# Patient Record
Sex: FEMALE | Race: WHITE
[De-identification: names, ages, dates, MRNs, and addresses within clinical notes are randomized per-mention and may not be internally consistent; named-entity substitution may affect disease eponyms.]

---

## 2018-11-26 ENCOUNTER — HOSPITAL ENCOUNTER (INPATIENT)
Dept: HOSPITAL 88 - ER | Age: 58
LOS: 4 days | Discharge: HOME | DRG: 371 | End: 2018-11-30
Attending: INTERNAL MEDICINE | Admitting: INTERNAL MEDICINE
Payer: COMMERCIAL

## 2018-11-26 VITALS — WEIGHT: 174 LBS | BODY MASS INDEX: 30.83 KG/M2 | HEIGHT: 63 IN

## 2018-11-26 DIAGNOSIS — Z79.84: ICD-10-CM

## 2018-11-26 DIAGNOSIS — Z88.2: ICD-10-CM

## 2018-11-26 DIAGNOSIS — Z88.8: ICD-10-CM

## 2018-11-26 DIAGNOSIS — R91.8: ICD-10-CM

## 2018-11-26 DIAGNOSIS — Z91.012: ICD-10-CM

## 2018-11-26 DIAGNOSIS — K58.9: ICD-10-CM

## 2018-11-26 DIAGNOSIS — I10: ICD-10-CM

## 2018-11-26 DIAGNOSIS — A04.72: Primary | ICD-10-CM

## 2018-11-26 DIAGNOSIS — R11.2: ICD-10-CM

## 2018-11-26 DIAGNOSIS — E11.9: ICD-10-CM

## 2018-11-26 DIAGNOSIS — J18.9: ICD-10-CM

## 2018-11-26 DIAGNOSIS — R53.81: ICD-10-CM

## 2018-11-26 LAB
ALBUMIN SERPL-MCNC: 4.7 G/DL (ref 3.5–5)
ALBUMIN/GLOB SERPL: 1.3 {RATIO} (ref 0.8–2)
ALP SERPL-CCNC: 98 IU/L (ref 40–150)
ALT SERPL-CCNC: 76 IU/L (ref 0–55)
AMYLASE SERPL-CCNC: 55 U/L (ref 25–125)
ANION GAP SERPL CALC-SCNC: 17.6 MMOL/L (ref 8–16)
BACTERIA URNS QL MICRO: (no result) /HPF
BASOPHILS # BLD AUTO: 0 10*3/UL (ref 0–0.1)
BASOPHILS NFR BLD AUTO: 0.4 % (ref 0–1)
BILIRUB UR QL: (no result)
BNP BLD-MCNC: < 10 PG/ML (ref 0–100)
BUN SERPL-MCNC: 22 MG/DL (ref 7–26)
BUN/CREAT SERPL: 24 (ref 6–25)
CALCIUM SERPL-MCNC: 10.9 MG/DL (ref 8.4–10.2)
CHLORIDE SERPL-SCNC: 91 MMOL/L (ref 98–107)
CLARITY UR: (no result)
CO2 SERPL-SCNC: 28 MMOL/L (ref 22–29)
COARSE GRAN CASTS URNS QL MICRO: (no result)
COLOR UR: YELLOW
DEPRECATED NEUTROPHILS # BLD AUTO: 7.3 10*3/UL (ref 2.1–6.9)
DEPRECATED RBC URNS MANUAL-ACNC: (no result) /HPF (ref 0–5)
EGFRCR SERPLBLD CKD-EPI 2021: > 60 ML/MIN (ref 60–?)
EOSINOPHIL # BLD AUTO: 0.2 10*3/UL (ref 0–0.4)
EOSINOPHIL NFR BLD AUTO: 1.4 % (ref 0–6)
EPI CELLS URNS QL MICRO: (no result) /LPF
ERYTHROCYTE [DISTWIDTH] IN CORD BLOOD: 12 % (ref 11.7–14.4)
GLOBULIN PLAS-MCNC: 3.7 G/DL (ref 2.3–3.5)
GLUCOSE SERPLBLD-MCNC: 247 MG/DL (ref 74–118)
HCT VFR BLD AUTO: 40 % (ref 34.2–44.1)
HGB BLD-MCNC: 14.3 G/DL (ref 12–16)
KETONES UR QL STRIP.AUTO: NEGATIVE
LEUKOCYTE ESTERASE UR QL STRIP.AUTO: NEGATIVE
LIPASE SERPL-CCNC: 61 U/L (ref 8–78)
LYMPHOCYTES # BLD: 2.7 10*3/UL (ref 1–3.2)
LYMPHOCYTES NFR BLD AUTO: 23.7 % (ref 18–39.1)
MAGNESIUM SERPL-MCNC: 1.9 MG/DL (ref 1.3–2.1)
MCH RBC QN AUTO: 29.5 PG (ref 28–32)
MCHC RBC AUTO-ENTMCNC: 35.8 G/DL (ref 31–35)
MCV RBC AUTO: 82.6 FL (ref 81–99)
MONOCYTES # BLD AUTO: 1 10*3/UL (ref 0.2–0.8)
MONOCYTES NFR BLD AUTO: 9.1 % (ref 4.4–11.3)
MUCOUS THREADS URNS QL MICRO: (no result)
NEUTS SEG NFR BLD AUTO: 64.7 % (ref 38.7–80)
NITRITE UR QL STRIP.AUTO: NEGATIVE
PLATELET # BLD AUTO: 512 X10E3/UL (ref 140–360)
POTASSIUM SERPL-SCNC: 2.6 MMOL/L (ref 3.5–5.1)
PROT UR QL STRIP.AUTO: (no result)
RBC # BLD AUTO: 4.84 X10E6/UL (ref 3.6–5.1)
SODIUM SERPL-SCNC: 134 MMOL/L (ref 136–145)
SP GR UR STRIP: 1.02 (ref 1.01–1.02)
UROBILINOGEN UR STRIP-MCNC: 0.2 MG/DL (ref 0.2–1)
WBC #/AREA URNS HPF: (no result) /HPF (ref 0–5)

## 2018-11-26 PROCEDURE — 83880 ASSAY OF NATRIURETIC PEPTIDE: CPT

## 2018-11-26 PROCEDURE — 71250 CT THORAX DX C-: CPT

## 2018-11-26 PROCEDURE — 83735 ASSAY OF MAGNESIUM: CPT

## 2018-11-26 PROCEDURE — 80053 COMPREHEN METABOLIC PANEL: CPT

## 2018-11-26 PROCEDURE — 82150 ASSAY OF AMYLASE: CPT

## 2018-11-26 PROCEDURE — 99285 EMERGENCY DEPT VISIT HI MDM: CPT

## 2018-11-26 PROCEDURE — 86738 MYCOPLASMA ANTIBODY: CPT

## 2018-11-26 PROCEDURE — 87040 BLOOD CULTURE FOR BACTERIA: CPT

## 2018-11-26 PROCEDURE — 82553 CREATINE MB FRACTION: CPT

## 2018-11-26 PROCEDURE — 81001 URINALYSIS AUTO W/SCOPE: CPT

## 2018-11-26 PROCEDURE — 71045 X-RAY EXAM CHEST 1 VIEW: CPT

## 2018-11-26 PROCEDURE — 83690 ASSAY OF LIPASE: CPT

## 2018-11-26 PROCEDURE — 84484 ASSAY OF TROPONIN QUANT: CPT

## 2018-11-26 PROCEDURE — 82550 ASSAY OF CK (CPK): CPT

## 2018-11-26 PROCEDURE — 87086 URINE CULTURE/COLONY COUNT: CPT

## 2018-11-26 PROCEDURE — 36415 COLL VENOUS BLD VENIPUNCTURE: CPT

## 2018-11-26 PROCEDURE — 87390 HIV-1 AG IA: CPT

## 2018-11-26 PROCEDURE — 80048 BASIC METABOLIC PNL TOTAL CA: CPT

## 2018-11-26 PROCEDURE — 93005 ELECTROCARDIOGRAM TRACING: CPT

## 2018-11-26 PROCEDURE — 83605 ASSAY OF LACTIC ACID: CPT

## 2018-11-26 PROCEDURE — 87493 C DIFF AMPLIFIED PROBE: CPT

## 2018-11-26 PROCEDURE — 86631 CHLAMYDIA ANTIBODY: CPT

## 2018-11-26 PROCEDURE — 74177 CT ABD & PELVIS W/CONTRAST: CPT

## 2018-11-26 PROCEDURE — 94640 AIRWAY INHALATION TREATMENT: CPT

## 2018-11-26 PROCEDURE — 87449 NOS EACH ORGANISM AG IA: CPT

## 2018-11-26 PROCEDURE — 82270 OCCULT BLOOD FECES: CPT

## 2018-11-26 PROCEDURE — 85025 COMPLETE CBC W/AUTO DIFF WBC: CPT

## 2018-11-26 PROCEDURE — 86332 IMMUNE COMPLEX ASSAY: CPT

## 2018-11-26 RX ADMIN — IPRATROPIUM BROMIDE AND ALBUTEROL SULFATE SCH ML: .5; 2.5 SOLUTION RESPIRATORY (INHALATION) at 21:45

## 2018-11-26 RX ADMIN — SODIUM CHLORIDE SCH MLS/HR: 9 INJECTION, SOLUTION INTRAVENOUS at 21:46

## 2018-11-26 RX ADMIN — METRONIDAZOLE SCH MLS/HR: 500 INJECTION, SOLUTION INTRAVENOUS at 22:23

## 2018-11-26 RX ADMIN — IPRATROPIUM BROMIDE AND ALBUTEROL SULFATE SCH ML: .5; 2.5 SOLUTION RESPIRATORY (INHALATION) at 19:00

## 2018-11-26 NOTE — XMS REPORT
Patient Summary Document

                             Created on: 2018



MIRIAM LIMON

External Reference #: [id number]

: 1960

Sex: Female



Demographics







                          Address                   [address] [place] [address]

 

                          Home Phone                [phone]

 

                          Preferred Language        Unknown

 

                          Marital Status            Unknown

 

                          Shinto Affiliation     Unknown

 

                          Race                      Unknown

 

                                        Additional Race(s)  

 

                          Ethnic Group              Unknown





Author







                          [facility]                    [facility]

 

                          [facility]

 

                          Address                   Unknown

 

                          Phone                     Unavailable







Care Team Providers







                    Care Team Member Name    Role                Phone

 

                    BABATUNDE VEGA    Unavailable         Unavailable







Problems

This patient has no known problems.



Allergies, Adverse Reactions, Alerts

This patient has no known allergies or adverse reactions.



Medications

This patient has no known medications.



Results







           Test Description    Test Time    Test Comments    Text Results    Atomic Results    Result

 Comments

 

                CHEST SINGLE (NOT PORTABLE)    2018 14:58:00                                               

                                                           [place] [address]      Patient Name: MIRIAM LIMON                        
          MR #: [id number]                     : 1960                  
                Age/Sex: 57/F  Acct #: [id number]                             
Req #: [id number]  Adm Physician:                                               
      Ordered by: ZEINA GARAY NP                            Report #: 
[id number]        Location: ER                                      Room/Bed:    
                
___________________________________________________________________________________________________
   Procedure: [id number] DX/CHEST SINGLE (NOT PORTABLE)  Exam Date:              
              Exam Time:                                               REPORT 
STATUS: Signed    EXAM: XR CHEST 1 VIEW      DATE: 2018 1:18 PM      
INDICATION: Fever       COMPARISON: 2015, no report available      
FINDINGS:      Lines and Tubes: None      Heart and Mediastinum: No acute 
cardiomediastinal findings.      Lungs and Pleura: No significant pleural 
effusion, pneumothorax, or focal   consolidation.      Bones and Soft Tissues: 
No acute findings.      IMPRESSION:    1.  No acute cardiopulmonary findings.   
  Signed by: Dr. Justo Pickett MD on 2018 2:58 PM        Dictated By: JUSTO PICKETT MD  Electronically Signed By: JUSTO PICKETT MD on 18 [id number]  
Transcribed By: GET on 18 145       COPY TO:   ZEINA GARAY NP

## 2018-11-26 NOTE — DIAGNOSTIC IMAGING REPORT
EXAM: CT Abdomen and Pelvis WITH contrast  

INDICATION:      

^abdominal pain

^[id number]

^1914

^Y 

COMPARISON: Chest x-ray dated 11/26/2018

TECHNIQUE: Abdomen and pelvis were scanned utilizing a multidetector helical

scanner from the lung base to the pubic symphysis after administration of IV

contrast. Coronal and sagittal reformations were obtained. Dose modulation,

iterative reconstruction, and/or weight based adjustment of the mA/kV was

utilized to reduce the radiation dose to as low as reasonably achievable.

Routine protocol was performed. Scan was performed when during portal venous

phase.    

     IV CONTRAST: 100 mL of Isovue-370

     ORAL CONTRAST: Water

            

COMPLICATIONS: None



RADIATION DOSE:

     Total DLP: 485.78 mGy*cm

     Estimated effective dose: (DLP x 0.015 x size factor) mSv

     CTDIvol has been reviewed. It is below the limits set by the Radiation

Protocol Committee (RPC).



FINDINGS:



LINES and TUBES: None.



LOWER THORAX:  Multiple bilateral nodules versus patchy opacities, measuring up

to 1.8 cm.



HEPATOBILIARY: Hepatomegaly. Diffuse hepatic steatosis.  No focal hepatic

lesions. No biliary ductal dilation. 



GALLBLADDER: Surgically absent.



SPLEEN: No splenomegaly. 



PANCREAS: No focal masses or ductal dilatation.  



ADRENALS: No left adrenal nodule. Focal thickening of the right adrenal base

versus nodules, measuring 1.3 cm and 0.9 cm.    



KIDNEYS/URETERS: Kidneys enhance symmetrically.  No hydronephrosis. No cystic

or solid mass lesions.  No stones.



GI TRACT: No abnormal distention or evidence of bowel obstruction. 

Questionable distal rectal wall thickening versus underdistention (series 2,

image 79). Few scattered colonic diverticula without evidence of

diverticulitis. Appendix is not visualized.



PELVIC ORGANS/BLADDER: Hysterectomy. Bladder is unremarkable.



LYMPH NODES: No lymphadenopathy.



VESSELS: Unremarkable.



PERITONEUM / RETROPERITONEUM: No free air or fluid.



BONES: Mild scoliosis and degenerative changes of lumbar spine, most notable at

L4-L5.



SOFT TISSUES: Unremarkable.            



IMPRESSION: 

1.  No acute inflammatory process in the abdomen/pelvis.

2.  Multiple bilateral lung base nodular densities versus patchy opacities.

Differentials include multifocal pneumonia, atypical infectious process, or

metastatic disease. Recommend correlation with dedicated chest CT.

3.  Questionable distal rectal wall thickening versus underdistention. If

clinically indicated, this can be further evaluated with endoscopic

examination.

4.  Small indeterminate right adrenal nodules.

5.  Enlarged steatotic liver.



Signed by: Dr. Miguelito Ramon MD on 11/26/2018 8:59 PM

## 2018-11-26 NOTE — XMS REPORT
Continuity of Care Document

                             Created on: 10/27/2016



MIRIAM LIMON

External Reference #: [id number]

: 1960

Sex: Female



Demographics







                          Address                   [address] [place] [address]  [id number]

 

                          Home Phone                [phone]

 

                          Preferred Language        Unknown

 

                          Marital Status            Unknown

 

                          Protestant Affiliation     Unknown

 

                          Race                      Unknown

 

                          Ethnic Group              Unknown





Author







                          Author                    [facility]              Interface

 

                          Address                   Unknown

 

                          Phone                     Unavailable



                                                    



Problems

                    





                    Problem                            Status                            Onset Date     

                          Classification                            Date Reported       

                          Comments                            Source                    

 

                    Acute sinusitis                                                        10/28/2016   

                          Diagnosis                            10/28/2016             

                                                      [facility]                    

 

                    Viral disease                                                        10/28/2016     

                          Diagnosis                            10/28/2016               

                                                      [facility]                    

 

                    Coxsackie virus disease                                                        10/09/2016

                            Diagnosis                            10/28/2016          

                                                      [facility]                    

 

                    Coxsackie Virus Disease                                                             

                          Problem                            10/28/2016                 

                                                      [facility]                    

 

                    Viral Disease                                                                       

                    Problem                            10/28/2016                             

                                        [facility]                    

 

                    Acute Sinusitis                                                                     

                          Problem                            10/28/2016                         

                                                      [facility]                    



                                                                                
                                                                                
      



Medications

                    





                    Medication                            Details                            Route      

                          Status                            Patient Instructions         

                          Ordering Provider                            Order Date           

                                        Source                    

 

                          Fenofibrate 48 MG Oral Tablet                            fenofibrate nanocrystallized

 48 mg tablet                                                        Active          

                                                                                         

                                        [facility]                    

 

                          gabapentin 300 MG Oral Capsule                            gabapentin 300 mg capsule

                                                        Active                       

                                                                                                    

[facility]                    

 

                          Hydrochlorothiazide 25 MG Oral Tablet                            hydrochlorothiazide

 25 mg tablet                                                        Active          

                                                                                         

                                        [facility]                    

 

                          Hydroxyzine Hydrochloride 25 MG Oral Tablet                            hydroxyzine

 HCl 25 mg tablet                                                        Active      

                                                                                       

                                        [facility]                    

 

                          Losartan Potassium 50 MG Oral Tablet                            losartan 50 mg tablet

                                                        Active                       

                                                                                                    

[facility]                    

 

                          Metformin hydrochloride 1000 MG Oral Tablet                            metformin 1,000

 mg tablet                                                        Active             

                                                                                            

                                        [facility]                    

 

                          Metoprolol Tartrate 50 MG Oral Tablet                            metoprolol tartrate

 50 mg tablet                                                        Active          

                                                                                         

                                        [facility]                    

 

                          Ondansetron 8 MG Disintegrating Oral Tablet                            ondansetron

 8 mg disintegrating tablet                                                        Active

                                                                                       

                                        [facility]                    

 

                          Sertraline 50 MG Oral Tablet                            sertraline 50 mg tablet   

                                                     Active                            

                                                                                    [facility]

                    

 

                          tizanidine 4 MG Oral Tablet                            tizanidine 4 mg tablet     

                                                   Active                              

                                                                                  [facility]

                    

 

                          Triamcinolone Acetonide 0.001 MG/MG Topical Ointment                            triamcinolone

 acetonide 0.1 % topical ointment                                                  

                    Active                                                                           

                                                      [facility]                    

 

                                        Amoxicillin 875 MG / Clavulanate 125 MG Oral Tablet [Augmentin]                 

                                        Augmentin 875 mg-125 mg tablet Take 1 tablet every 12 hours by oral route

 with meals for 10 days.                                                        Active

                                                                                       

                                        [facility]                    

 

                          benzonatate 200 MG Oral Capsule                            benzonatate 200 mg capsule

                                                        Active                       

                                                                                                    

[facility]                    

 

                                        Ciprofloxacin 3 MG/ML / Dexamethasone 1 MG/ML Otic Suspension [Ciprodex]        

                                        Ciprodex 0.3 %-0.1 % ear drops,suspension                      

                                                Active                                               

                                                                            [facility]            

        

 

                                        Fluticasone propionate 0.05 MG/ACTUAT Metered Dose Nasal Spray                  

                                        fluticasone 50 mcg/actuation nasal spray,suspension                      

                                                Active                                               

                                                                            [facility]            

        

 

                          Ondansetron 4 MG Oral Tablet                            ondansetron HCl 4 mg tablet

                                                        Active                       

                                                                                                    

[facility]                    

 

                          benzonatate 100 MG Oral Capsule [Tessalon Perles]                            Tessalon

 Perles 100 mg capsule Take 1 capsule 3 times a day by oral route as needed for 
cough.                                                        Active                 

                                                                                                

                                        [facility]                    



                                                                                
                                                                                
                                                                                
                                                                                
                    



Allergies, Adverse Reactions, Alerts

                    





                    Substance                            Category                            Reaction   

                          Severity                            Reaction type           

                          Status                            Date Reported                     

                          Comments                            Source                    

 

                    Egg Derived                                                                         

                                                Allergy to substance                            

                            10/09/2016                                               

                                        [facility]                    

 

                    Fish Derived                                                                        

                                                      Allergy to substance                         

                                                10/09/2016                                              

                                        [facility]                    

 

                    Soma                                                                                

                                                Allergy to substance                                   

                     10/09/2016                                                        

[facility]                    

 

                          Sulfa (Sulfonamide Antibiotics)                                                   

                                                                            Allergy to substance      

                                                      10/09/2016                         

                                                      [facility]                    



                                                                                
               



Immunizations

                    





                    Immunization                            Date Given                            Site  

                          Status                            Last Updated             

                          Comments                            Source                    



                                                                        



Results

                    





                    Order Name                            Results                            Value      

                          Reference Range                            Date                

                          Interpretation                            Comments                       

                                        Source                    

 

                                                Influenza A                            negative         

                                                 10/28/2016                            

                                                        [facility]                   

 

 

                                                Influenza B                            negative         

                                                 10/28/2016                            

                                                        [facility]                   

 

 

                                                RESULT                            negative              

                                                10/28/2016                                 

                                                      [facility]                    

 

                                                SWAB LOCATION                            Left and Right 

tonsillar pillars                                                          10/28/2016

                                                                                    [facility]

                    



                                                                                
                                                               



Vital Signs

                    





                    Vital Sign                            Value                            Date         

                          Comments                            Source                    

 

                    Diastolic (mm Hg)                            82                             10/28/2016

                                                        [facility]                   

 

 

                    Height                            63                             10/28/2016         

                                                      [facility]                    

 

                    Systolic (mm Hg)                            124                             10/28/2016

                                                        [facility]                   

 

 

                    Weight                            174                             10/28/2016        

                                                      [facility]                    

 

                    Diastolic (mm Hg)                            80                             10/09/2016

                                                        [facility]                   

 

 

                    Height                            63                             10/09/2016         

                                                      [facility]                    

 

                    Systolic (mm Hg)                            126                             10/09/2016

                                                        [facility]                   

 

 

                    Weight                            174                             10/09/2016        

                                                      [facility]                    



                                                                                
                                                                                
                                      



Encounters

                    





                    Location                            Location Details                            Encounter

 Type                            Encounter Number                            Reason For

 Visit                            Attending Provider                            ADM Date

                            DC Date                            Status                

                                        Source                    

 

                          [place] - [facility] - [id number] Johns: [address] [place] [address], Ph. [phone]                               [id number]          

                                                      Irais Brooks                        

                    10/09/2016                                                                        

                                        [facility]                    

 

                          [place] - [facility] - [id number] Johns: [address] [place] [address], Ph. [phone]                               [id number]          

                                                      Irais Cecilia                        

                    10/09/2016                                                                        

                                        [facility]                    

 

                          [place] [facility] - [id number]                                                 

                                        Sarina Aguero, FNP: [address] [facility], [place] [address], Ph. [phone]                                    [id number]               

                                                Sarina Aguero                             10/28/2016

                                                                                    [facility]

                    



                                                                                
                           



Procedures

                    





                    Procedure                            Code                            Date           

                          Perfomer                            Comments                        

                                        Source                    

 

                    Appendectomy                                                                        

                                                                            [facility]         

           

 

                    Cholecystectomy                                                                     

                                                                            [facility]      

              

 

                    Hysterectomy                                                                        

                                                                            [facility]         

           

 

                    Tonsillectomy                                                                       

                                                                            [facility]

## 2018-11-26 NOTE — XMS REPORT
Encounter Summary

                             Created on: 10/09/2016



Kerri Sanchez

External Reference #: [id number]

: 1960

Sex: Female



Demographics







                          Address                   [address] [place]  [address]

 

                          Home Phone                [phone]

 

                          Preferred Language        Unknown

 

                          Marital Status            Never 

 

                          Zoroastrian Affiliation     Unknown

 

                          Race                      Unknown

 

                          Ethnic Group              Unknown





Author







                          Organization              Unknown

 

                          Address                   [address] [place] [address]  [address]



 

                          Phone                     [phone]



                                                      



Reason for Visit

                      





                                        Medical Complaint; hand, mouth, feet rash x2 days                



                                                                              



Instructions

          





                                        1. Coxsackie virus disease

 

                                         triamcinolone acetonide 0.1 % topical ointment

 

                                         hydroxyzine HCl 25 mg tablet







Discussion Note: None recorded.



Patient educational handouts: No information available.                         
                                                    



Plan of Care

          





Patient Instructions





                                        Instruct pt that this is a virus may take 7-10 days to recover. Ok to take tynenol/ibuprofen

 for pain/fever. Increase fluid intake. Avoid contact with other members at home
until rash and fever have resolve. If noted fever increase worsening or unable 
to swallow anything down, seek ER. Work note given for pt to RTC on 10/14/16 if 
afebrile and rash free.











                Reminders                                       Provider

 

                Appointments    None recorded.                   

 

                Lab             None recorded.                   

 

                Referral        None recorded.                   

 

                Procedures      None recorded.                   

 

                Surgeries       None recorded.                   

 

                Imaging         None recorded.                   



                                                                              



Medications

                      





                    Name                      Start Date                                      

 

                          fenofibrate nanocrystallized 48 mg tablet    

 

                          gabapentin 300 mg capsule    

 

                          hydrochlorothiazide 25 mg tablet    

 

                                        hydroxyzine HCl 25 mg tablet

 Take 1 tablet 3 times a day by oral route as needed for itching.    

 

                          losartan 50 mg tablet     

 

                          metformin 1,000 mg tablet    

 

                          metoprolol tartrate 50 mg tablet    

 

                          ondansetron 8 mg disintegrating tablet    

 

                          sertraline 50 mg tablet    

 

                          tizanidine 4 mg tablet    

 

                                        triamcinolone acetonide 0.1 % topical ointment

 APPLY A THIN LAYER TO THE AFFECTED AREA(S) BY TOPICAL ROUTE 2 TIMES PER DAY x 
10 days                                 



                                                                                
                                                                                
                          



Medications Administered

          



  None recorded.                                                                
               



Vitals

          





                Height          Weight          BMI             Blood Pressure 

 

                 5 ft 3 in        174 lbs         30.8            126/80  



                                                                              



Lab Results

                      



              None recorded.                                                    
                                               



Allergies

          





                Name            Reaction        Severity        Onset

 

                Egg Derived                                   

 

                Fish Derived                                  

 

                Soma                                          

 

                Sulfa (Sulfonamide Antibiotics)                                  



                                                                                
                           



Problems

                      





                Name                      Status                      Onset Date                      

Source                                                  

 

                Coxsackie Virus Disease    Active                         Encounter



                                                                                
       



Procedures

                      





                Date                      Name                      Performed by                      

                

 

                                       Appendectomy        Information not available

 

                                       Cholecystectomy     Information not available

 

                                       Hysterectomy        Information not available

 

                                       Tonsillectomy       Information not available



                                                                                
                                     



Vaccine List

          



None recorded.                                                                  
           



Social History

          





                    Smoking Status      Never Smoker         



                                                                              



Past Encounters

                      





                                        10/09/2016

Coxsackie Virus Disease

Irais Brooks, FNP: [address], [place] [address], Ph. [phone]



                                                                                
       



History of Present Illness

                      





                                                   Rash-Abscess-Sting-Skin Lesion-Bite 1

 

                          Reported By:              Patient

 

                          HPI:                      Location: hands, feet; mouth. Quality: multiple. Severity: worsening. Duration:

 has noted for <1 week. Onset/Timing: abrupt onset. Context: no new detergents 
or skin products, no one else with similar rash, no sting or bite. Aggravating 
factors: nothing makes it worse. Alleviating factors: nothing gives relief. 
Associated Symptoms: no fever, no cold symptoms, no nausea, no vomiting, no 
diarrhea, no urinary symptoms



                                                                              



Review of Systems

                      





                                                   Basic

 

                          Reported By:              Patient

 

                          Constitutional:           Constitutional: no fever

 

                          Eyes:                     Eyes: no eye complaints

 

                          Ears-Nose-Mouth-Throat:    Ears: no ear complaints. Nose: no nose/sinus problems. 

Mouth/Throat: no sore throat, no bleeding gums, no mouth complaints, no teeth 
problems

 

                          Cardiovascular:           Cardiovascular: no chest pain, no shortness of breath, no known

 heart murmur

 

                          Respiratory:              Respiratory: no cough, no wheezing, no shortness of breath

 

                          Gastrointestinal:         Gastrointestinal: no abdominal pain, no vomiting / diarrhea

 

                          Genitourinary:            Genitourinary: no urinary complaints, no discharge

 

                          Musculoskeletal:          Musculoskeletal: no muscle aches, no muscle weakness, no arthralgias/joint

 pain, no back pain

 

                          Skin:                     Skin: no abnormal / changing mole, no jaundice, rash

 

                          Neurologic:               Neurologic: no loss of consciousness, no weakness, no numbness, no 

seizures, no dizziness, no headaches



                                                                              



Physical Exam

                      





                                                   Adult Basic, Adult Female Complete

 

                          Constitutional:           General Appearance: healthy-appearing, well-nourished, well-developed.

 Level of Distress: NAD. Ambulation: ambulating normally

 

                          Psychiatric:              Mental Status: active and alert. Orientation: to time, to place, to

 person

 

                          Eyes:                     Lids and Conjunctivae: non-injected, no discharge, no pallor. Pupils: PERRLA.

 Corneas: grossly intact. EOM: EOMI. Lens: clear. Sclerae: non-icteric. Vision: 
acuity grossly intact

 

                          Ear-Nose-Mouth-Throat:    Ears: no lesions on external ear, no outer ear tenderness,

 EACs clear, TMs clear. Hearing: no hearing loss. Nose: no lesions on external 
nose, nares patent, no septal deviation, nasal passages clear, no sinus 
tenderness, no nasal discharge. Lips, Teeth, and Gums: no mouth or lip ulcers, 
no bleeding gums, normal dentition. Oropharynx: ; ulcerated pink lesion to 
oropharynx

 

                          Neck:                     Neck: supple, trachea midline, no masses, FROM. Lymph Nodes: no cervical 

LAD, no supraclavicular LAD. Thyroid: no enlargement, non-tender, no nodules

 

                          Lungs:                    Respiratory effort: no dyspnea, no tachypnea, no use of accessory muscles,

 no intercostal retractions. Auscultation: breath sounds normal

 

                          Cardiovascular:           Heart Auscultation: RRR, no murmurs. Neck vessels: no carotid bruits



 

                          Skin:                     Inspection and palpation: rash

## 2018-11-26 NOTE — XMS REPORT
Encounter Summary

                             Created on: 10/28/2016



Kerri Sanchez

External Reference #: [id number]

: 1960

Sex: Female



Demographics







                          Address                   [address] [place]  [address]

 

                          Home Phone                [phone]

 

                          Preferred Language        Unknown

 

                          Marital Status            Never 

 

                          Hinduism Affiliation     Unknown

 

                          Race                      Unknown

 

                          Ethnic Group              Unknown





Author







                          Organization              Unknown

 

                          Address                   [address] [place]  [address]



 

                          Phone                     [phone]



                                                      



Reason for Visit

                      





                                        Medical Complaint                



                                                                              



Instructions

          





                                        1. Acute sinusitis

 

                                         Augmentin 875 mg-125 mg tablet

 

                                         Tessalon Perles 100 mg capsule

 

                                         sinusitis: care instructions

 

                                        2. Viral disease

 

                                         rapid flu (A+B)

 

                                         viral infections: care instructions

 

                                         rapid strep group A, throat







Discussion Note: None recorded.                                                 
                                                



Plan of Care

          





Patient Instructions





                                        Try warm salt water gargles, throat lozanges, soups ortea with honey and/or lemon

 juice to soothe the throat.Please drink plenty of fluids, rest, good hand 
washing, cover your mouth while coughing and sneezing. Take ibuprofen or tylenol
every 6 hours as needed for fever and aches.Try flonase nasal spray, 2sprays 
to each nostril once a day for nasal congestion. Consider using a saline sinus 
wash or NetiPot for sinus cleansing.Please read all the side effects of the 
medications, if you develop any side effects immediately stop the medication and
please [facility] or call 911. Follow up with 
PCP/UC/ER or seek care if symptomsget worseor no improvement in 3 to 4 days. 
Patient verbalizes understanding and agrees to the plan.











                Reminders                                       Provider

 

                Appointments    None recorded.                   

 

                Lab             Rapid Flu (A+B)    10/28/2016      [facility]

 

                               Rapid Strep Group a, Throat    10/28/2016      [facility]

 

                Referral        None recorded.                   

 

                Procedures      None recorded.                   

 

                Surgeries       None recorded.                   

 

                Imaging         None recorded.                   



                                                                                
                 



Medications

                      





                    Name                      Start Date                                      

 

                                        Augmentin 875 mg-125 mg tablet

 Take 1 tablet every 12 hours by oral route with meals for 10 days.    

 

                          benzonatate 200 mg capsule    

 

                          Ciprodex 0.3 %-0.1 % ear drops,suspension    

 

                          fenofibrate nanocrystallized 48 mg tablet    

 

                          fluticasone 50 mcg/actuation nasal spray,suspension    

 

                          gabapentin 300 mg capsule    

 

                          hydrochlorothiazide 25 mg tablet    

 

                          hydroxyzine HCl 25 mg tablet    

 

                          losartan 50 mg tablet     

 

                          metformin 1,000 mg tablet    

 

                          metoprolol tartrate 50 mg tablet    

 

                          ondansetron 8 mg disintegrating tablet    

 

                          ondansetron HCl 4 mg tablet    

 

                          sertraline 50 mg tablet    

 

                                        Tessalon Perles 100 mg capsule

 Take 1 capsule 3 times a day by oral route as needed for cough.    

 

                          tizanidine 4 mg tablet    

 

                          triamcinolone acetonide 0.1 % topical ointment    



                                                                                
                                                                                
                                                                                
     



Medications Administered

          



  None recorded.                                                                
               



Vitals

          





                Height          Weight          BMI             Blood Pressure 

 

                 5 ft 3 in        174 lbs         30.8            124/82  



                                                                              



Lab Results

                      





                Date                      Name                      Result                      Description

                      Value                      Range                      Status     

                                                        

 

                   Rapid Flu (A+B)              Influenza a     negative             

 

                                      Influenza B     negative             

 

                   Rapid Strep Group a, Throat              Result     negative             

 

                                      Swab Location     Left and Right tonsillar pillars             



                                                                                
                 



Allergies

          





                Name            Reaction        Severity        Onset

 

                Egg Derived                                   

 

                Fish Derived                                  

 

                Soma            Hives                          

 

                Sulfa (Sulfonamide Antibiotics)    Hives                          



                                                                                
                           



Problems

                      





                Name                      Status                      Onset Date                      

Source                                                  

 

                Coxsackie Virus Disease    Active                         Encounter

 

                Viral Disease    Active                         Encounter

 

                Acute Sinusitis    Active                         Encounter



                                                                                
                           



Procedures

                      





                Date                      Name                      Performed by                      

                

 

                                       Appendectomy        Information not available

 

                                       Cholecystectomy     Information not available

 

                                       Hysterectomy        Information not available

 

                                       Tonsillectomy       Information not available



                                                                                
                                     



Vaccine List

          



None recorded.                                                                  
           



Social History

          





                    Smoking Status      Current Some Day Smoker     



                                                                              



Past Encounters

                      





                                        10/28/2016

Acute Sinusitis; Viral Disease

Sarina Aguero, LAYLA: [address] [place] [address], Ph. [phone]

 

                                        10/09/2016

Coxsackie Virus Disease

Irais Cecilia, PILAR: [address] [place] [address], Ph. [phone]



                                                                                
                 



History of Present Illness

                      





                                                   Sinus-Congestion-Allergy

 

                          Reported By:              Patient

 

                          HPI:                      Location: head/sinuses. Quality: sore throat, colored phlegm, nasal/sinus 

congestion, dry cough. Duration: 2 weeksdays. Severity: moderate. Onset/Timing: 
sudden. Context: no sick contacts, no foreign travel, non-smoker. Modifying 
factors: OTC medication. Associated Symptoms: no shortness of breath, no 
wheezing, no change in number of pillows needed to sleep at night, no sweats, no
significant weight gain, no significant weight loss, no morning cough, no 
vomiting, no diarrhea, no rash, no nausea, green sputum, sore throat

 

                          Notes:                    Pt also reports low grade fever, sore throat, chills and body aches x 2 

days.



                                                                              



Review of Systems

                      





                                                   Basic

 

                          Reported By:              Patient

 

                          Constitutional:           Constitutional: fever

 

                          Eyes:                     Eyes: no eye complaints

 

                          Ears-Nose-Mouth-Throat:    Ears: no ear complaints. Nose: nose/sinus problems. Mouth/Throat:

 no bleeding gums, no mouth complaints, no teeth problems, sore throat

 

                          Cardiovascular:           Cardiovascular: no chest pain, no shortness of breath, no known

 heart murmur

 

                          Respiratory:              Respiratory: no wheezing, no shortness of breath, cough

 

                          Gastrointestinal:         Gastrointestinal: no abdominal pain, no vomiting / diarrhea

 

                          Genitourinary:            Genitourinary: no urinary complaints, no discharge

 

                          Musculoskeletal:          Musculoskeletal: no muscle weakness, no arthralgias/joint pain,

 no back pain, muscle aches

 

                          Skin:                     Skin: no abnormal / changing mole, no jaundice, no rashes

 

                          Neurologic:               Neurologic: no loss of consciousness, no weakness, no numbness, no 

seizures, no dizziness, headache



                                                                              



Physical Exam

                      





                                                   Adult Basic, Adult Female Complete

 

                          Reported By:              Patient

 

                          Constitutional:           General Appearance: healthy-appearing, well-nourished, well-developed.

 Level of Distress: NAD. Ambulation: ambulating normally

 

                          Psychiatric:              Mental Status: active and alert. Orientation: to time, to place, to

 person

 

                          Eyes:                     Lids and Conjunctivae: non-injected, no discharge, no pallor. Pupils: PERRLA.

 EOM: EOMI. Lens: clear. Sclerae: non-icteric

 

                          Ear-Nose-Mouth-Throat:    Ears: no lesions on external ear, no outer ear tenderness,

 EACs clear, TMs clear, middle ear fluid. Hearing: no hearing loss. Nose: no 
lesions on external nose, nares patent, no septal deviation, nasal passages 
clear, sinus tenderness, nasal discharge--purulent, post nasal drip; red swollen
nasal mucosa. Lips, Teeth, and Gums: no mouth or lip ulcers. Oropharynx: moist 
mucous membranes, no exudates, erythema, tonsils absent

 

                          Neck:                     Neck: supple. Lymph Nodes: no cervical LAD

 

                          Lungs:                    Respiratory effort: no dyspnea, no tachypnea. Auscultation: breath sounds

 normal

 

                          Cardiovascular:           Heart Auscultation: RRR, no murmurs

 

                          Neurologic:               Gait and Station: normal gait

 

                          Skin:                     Inspection and palpation: no rash

## 2018-11-26 NOTE — DIAGNOSTIC IMAGING REPORT
EXAM: XR CHEST 1 VIEW



DATE: 11/26/2018 1:18 PM



INDICATION: Fever 



COMPARISON: 1/26/2015, no report available



FINDINGS:



Lines and Tubes: None



Heart and Mediastinum: No acute cardiomediastinal findings.



Lungs and Pleura: No significant pleural effusion, pneumothorax, or focal

consolidation.



Bones and Soft Tissues: No acute findings.



IMPRESSION: 

1.  No acute cardiopulmonary findings.



Signed by: Dr. Justo Pickett MD on 11/26/2018 2:58 PM

## 2018-11-27 VITALS — SYSTOLIC BLOOD PRESSURE: 115 MMHG | DIASTOLIC BLOOD PRESSURE: 87 MMHG

## 2018-11-27 VITALS — SYSTOLIC BLOOD PRESSURE: 144 MMHG | DIASTOLIC BLOOD PRESSURE: 69 MMHG

## 2018-11-27 VITALS — SYSTOLIC BLOOD PRESSURE: 127 MMHG | DIASTOLIC BLOOD PRESSURE: 74 MMHG

## 2018-11-27 VITALS — DIASTOLIC BLOOD PRESSURE: 73 MMHG | SYSTOLIC BLOOD PRESSURE: 119 MMHG

## 2018-11-27 VITALS — SYSTOLIC BLOOD PRESSURE: 127 MMHG | DIASTOLIC BLOOD PRESSURE: 75 MMHG

## 2018-11-27 VITALS — DIASTOLIC BLOOD PRESSURE: 69 MMHG | SYSTOLIC BLOOD PRESSURE: 144 MMHG

## 2018-11-27 LAB
ALBUMIN SERPL-MCNC: 3.5 G/DL (ref 3.5–5)
ALBUMIN/GLOB SERPL: 1.3 {RATIO} (ref 0.8–2)
ALP SERPL-CCNC: 74 IU/L (ref 40–150)
ALT SERPL-CCNC: 51 IU/L (ref 0–55)
ANION GAP SERPL CALC-SCNC: 13.7 MMOL/L (ref 8–16)
BASOPHILS # BLD AUTO: 0 10*3/UL (ref 0–0.1)
BASOPHILS NFR BLD AUTO: 0.4 % (ref 0–1)
BUN SERPL-MCNC: 15 MG/DL (ref 7–26)
BUN/CREAT SERPL: 20 (ref 6–25)
C DIFFICILE TOXIN A&B AMP PROB: (no result)
CALCIUM SERPL-MCNC: 9.3 MG/DL (ref 8.4–10.2)
CHLORIDE SERPL-SCNC: 99 MMOL/L (ref 98–107)
CK MB SERPL-MCNC: 0.4 NG/ML (ref 0–5)
CK SERPL-CCNC: 62 IU/L (ref 29–168)
CO2 SERPL-SCNC: 27 MMOL/L (ref 22–29)
DEPRECATED NEUTROPHILS # BLD AUTO: 4.2 10*3/UL (ref 2.1–6.9)
EGFRCR SERPLBLD CKD-EPI 2021: > 60 ML/MIN (ref 60–?)
EOSINOPHIL # BLD AUTO: 0.2 10*3/UL (ref 0–0.4)
EOSINOPHIL NFR BLD AUTO: 2.3 % (ref 0–6)
ERYTHROCYTE [DISTWIDTH] IN CORD BLOOD: 12.3 % (ref 11.7–14.4)
GLOBULIN PLAS-MCNC: 2.8 G/DL (ref 2.3–3.5)
GLUCOSE SERPLBLD-MCNC: 172 MG/DL (ref 74–118)
HCT VFR BLD AUTO: 31.1 % (ref 34.2–44.1)
HEMOCCULT STL QL: NEGATIVE
HGB BLD-MCNC: 10.8 G/DL (ref 12–16)
HIV1+2 AB SPEC QL IA.RAPID: (no result)
LYMPHOCYTES # BLD: 3 10*3/UL (ref 1–3.2)
LYMPHOCYTES NFR BLD AUTO: 35.5 % (ref 18–39.1)
MCH RBC QN AUTO: 29.1 PG (ref 28–32)
MCHC RBC AUTO-ENTMCNC: 34.7 G/DL (ref 31–35)
MCV RBC AUTO: 83.8 FL (ref 81–99)
MONOCYTES # BLD AUTO: 0.9 10*3/UL (ref 0.2–0.8)
MONOCYTES NFR BLD AUTO: 10.9 % (ref 4.4–11.3)
NEUTS SEG NFR BLD AUTO: 50 % (ref 38.7–80)
PLATELET # BLD AUTO: 373 X10E3/UL (ref 140–360)
POTASSIUM SERPL-SCNC: 2.7 MMOL/L (ref 3.5–5.1)
RBC # BLD AUTO: 3.71 X10E6/UL (ref 3.6–5.1)
SODIUM SERPL-SCNC: 137 MMOL/L (ref 136–145)

## 2018-11-27 RX ADMIN — SODIUM CHLORIDE SCH MLS/HR: 9 INJECTION, SOLUTION INTRAVENOUS at 16:28

## 2018-11-27 RX ADMIN — Medication PRN MG: at 23:46

## 2018-11-27 RX ADMIN — METRONIDAZOLE SCH MLS/HR: 500 INJECTION, SOLUTION INTRAVENOUS at 14:15

## 2018-11-27 RX ADMIN — IPRATROPIUM BROMIDE AND ALBUTEROL SULFATE SCH ML: .5; 2.5 SOLUTION RESPIRATORY (INHALATION) at 14:32

## 2018-11-27 RX ADMIN — VANCOMYCIN HYDROCHLORIDE SCH MG: 250 CAPSULE ORAL at 23:45

## 2018-11-27 RX ADMIN — IPRATROPIUM BROMIDE AND ALBUTEROL SULFATE SCH ML: .5; 2.5 SOLUTION RESPIRATORY (INHALATION) at 06:15

## 2018-11-27 RX ADMIN — METRONIDAZOLE SCH MLS/HR: 500 INJECTION, SOLUTION INTRAVENOUS at 05:06

## 2018-11-27 RX ADMIN — IPRATROPIUM BROMIDE AND ALBUTEROL SULFATE SCH ML: .5; 2.5 SOLUTION RESPIRATORY (INHALATION) at 11:00

## 2018-11-27 RX ADMIN — SODIUM CHLORIDE SCH MLS/HR: 9 INJECTION, SOLUTION INTRAVENOUS at 09:06

## 2018-11-27 RX ADMIN — SODIUM CHLORIDE SCH MLS/HR: 9 INJECTION, SOLUTION INTRAVENOUS at 01:06

## 2018-11-27 RX ADMIN — IPRATROPIUM BROMIDE AND ALBUTEROL SULFATE SCH ML: .5; 2.5 SOLUTION RESPIRATORY (INHALATION) at 23:10

## 2018-11-27 RX ADMIN — SODIUM CHLORIDE PRN MG: 900 INJECTION INTRAVENOUS at 04:00

## 2018-11-27 RX ADMIN — METRONIDAZOLE SCH MLS/HR: 500 INJECTION, SOLUTION INTRAVENOUS at 21:46

## 2018-11-27 RX ADMIN — SODIUM CHLORIDE SCH MLS/HR: 9 INJECTION, SOLUTION INTRAVENOUS at 23:46

## 2018-11-27 RX ADMIN — VANCOMYCIN HYDROCHLORIDE SCH MG: 250 CAPSULE ORAL at 17:18

## 2018-11-27 RX ADMIN — Medication PRN MG: at 02:50

## 2018-11-27 RX ADMIN — IPRATROPIUM BROMIDE AND ALBUTEROL SULFATE SCH ML: .5; 2.5 SOLUTION RESPIRATORY (INHALATION) at 03:12

## 2018-11-27 RX ADMIN — IPRATROPIUM BROMIDE AND ALBUTEROL SULFATE SCH ML: .5; 2.5 SOLUTION RESPIRATORY (INHALATION) at 19:20

## 2018-11-27 NOTE — DIAGNOSTIC IMAGING REPORT
EXAMINATION: CT scan of the chest  without contrast.



TECHNIQUE: Spiral CT images of the chest were performed from the lung apices to

the level of the adrenal glands.  No intravenous contrast was administered per

referring physician request. Coronal and sagittal reformatted images were

obtained.



COMPARISON: CT abdomen and pelvis 11/26/2018



CLINICAL HISTORY:Lung nodules



DISCUSSION:   ABSENCE OF INTRAVENOUS CONTRAST DECREASES SENSITIVITY FOR

DETECTION OF FOCAL LESIONS AND VASCULAR PATHOLOGY.



LINES/TUBES:  None.



LUNGS AND AIRWAYS:  Multiple patchy groundglass opacities and consolidations

predominantly in a peribronchial distribution in the upper lobes for example on

series 3 image 35 and image 40, and in the lower lobes for example on series 3

image 67 and 74. Some of the opacities have a more nodular configuration,

measuring up to 1.1 cm and 1.4 cm in the right lower lobe and a 1.1 cm in the

left lower lobe. Several subcentimeter groundglass and solid nodules are also

noted, for example in the periphery of the left lung seen on series 3 image 40,

and in the right lower lobe seen on series 3 image 59 and 60. Trachea, mainstem

bronchi, and lobar bronchi are patent. No significant bronchiectasis.



PLEURA: No pneumothorax or pleural effusions.  



HEART AND MEDIASTINUM:  Visualized portions of the thyroid gland are

unremarkable. Atherosclerotic calcification of the innominate artery. No

ectasia or aneurysmal dilatation of the thoracic aorta. Pulmonary outflow tract

is of normal caliber. No pericardial effusion. No axillary, hilar, or

mediastinal lymphadenopathy.



LYMPH NODES: As above



ABDOMEN: Visualized portions of the liver, spleen, and adrenal glands are

unremarkable.



BONES AND SOFT TISSUES: No osseous destructive lesions.



IMPRESSION: 



Multifocal central predominant groundglass nodules and peribronchial

consolidations, most likely representative of atypical infection, to include

mycobacterial and fungal etiologies, though the differential diagnosis includes

vasculitis/collagen vascular disease in the appropriate clinical setting.



Short-term follow-up CT scan of the chest (1-2 months) after treatment is

suggested to document resolution.



Signed by: Dr. Emiliano Spence M.D. on 11/27/2018 2:07 PM

## 2018-11-27 NOTE — CONSULTATION
DATE OF CONSULTATION:    



INFECTIOUS DISEASE CONSULTATION 



REASON FOR CONSULTATION:  Sepsis, C. diff colitis.



HISTORY OF PRESENT ILLNESS:  This is a patient who is a very pleasant 

57-year-old female who has history of diabetes mellitus, hypertension, 

irritable bowel syndrome, rheumatic fever.  The patient comes into the 

emergency room because she has been sick for the last 2 weeks or so.  The 

patient started to have what seems like a flu-like illness, not feeling 

well, congested.  Apparently started to have some nausea, vomiting and 

cough.  She went to see a physician who gave her oral antibiotic, but not 

any better.  Came to the emergency room because she was not feeling well.  

She was also having diarrhea.  Patient is being admitted.  Her C. diff is 

coming back positive; so, Infectious Disease was consulted.



PAST MEDICAL HISTORY:  Hypertension, diabetes mellitus, irritable bowel 

syndrome.



PAST SURGICAL HISTORY:  Hysterectomy, cholecystectomy, tonsillectomy, 

appendectomy.



ALLERGIES:  SULFA.



SOCIAL HISTORY:  There is no smoking, drug abuse, alcohol abuse.  She does 

have 2 cats and 2 dogs in the house.



LABORATORY DATA:  On admission white count was 11.27, hemoglobin 14, 

hematocrit 40, her platelet 512.  Sodium 137, potassium 2.7 on admission.  

Glucose of 247.  Lactic acid 33.9.  AST 65, ALT 76.  Her C. diff toxin was 

positive.  Her chest CT showed multifocal granulized nodules most likely 

representing atypical infection.  



She had CT abdomen and pelvis, showed also lung base densities.



MEDICATION:  The patient is currently on metronidazole and levofloxacin.



PHYSICAL EXAMINATION:  

GENERAL:  She is currently alert, oriented, does not seem to be in acute 

distress. 

VITALS:  Stable.  Currently afebrile. 

HEENT:  She does not appear icteric.  Normocephalic. 

NECK:  Supple.  No JVD.  No lymphadenopathy.  No thyromegaly.

CHEST:  Clear bilaterally. 

HEART:  S1, S2.  No S3, no S4, no murmur. 

ABDOMEN:  Soft.  Bowel sounds present.  No tenderness.

EXTREMITIES:  No edema.  

SKIN:  No rash.



IMPRESSION:   

1. Atypical pneumonia.

2. Clostridium difficile colitis.  



Agree with Levaquin and oral Flagyl.  Will add oral vancomycin.  Will check 

for HIV, urine legionella antigen, mycoplasma and Chlamydia serology.  

Agree with IV fluid.  Will follow with you.  Thank you for asking me to see 

this patient. 



 



DD:  11/27/2018 16:15

DT:  11/27/2018 17:39

Job#:  [id number]

## 2018-11-27 NOTE — HISTORY AND PHYSICAL
HISTORY OF PRESENT ILLNESS:  Ms. Sanchez is a 57-year-old female with history 

of diabetes, hypertension, IBS, rheumatic fever.  She came to the emergency 

room complaining of being sick for 11 days with diarrhea, nausea, vomiting 

and cough.  She was treated several times by her PCP with p.o. antibiotics. 

 She did not improve, so she decided to come to the emergency room.  



PAST MEDICAL HISTORY:  She has hypertension, diabetes, IBS.  



SOCIAL HISTORY:  She occasionally smokes.  She does not drink.



ALLERGIES:  SHE IS ALLERGIC TO SULFA.



SURGICAL HISTORY:  She had cholecystectomy, hysterectomy, tonsillectomy, 

appendectomy. 



PHYSICAL EXAMINATION

GENERAL:  Today, she is awake and alert. 

VITAL SIGNS:  Temperature 98.2.  /74. 

HEART:  Regular rate. 

LUNGS:  Clear to auscultation. 

ABDOMEN:  Soft.



LABS:  On the blood work, potassium is 2.7, creatinine 0.75, glucose 172.  

White count 8.43, hemoglobin 10.8, hematocrit 31.1.  



She had a chest x-ray done that shows no acute findings.  Had an abdominal 

and pelvic CT that shows no acute inflammatory process in the abdomen and 

pelvis.  Multiple bilateral lung base nodular densities including 

pneumonia, atypical infection or metastatic disease.  She is going to need 

a chest CT.



ASSESSMENT AND PLAN

1. Abdominal pain, vomiting and diarrhea.  

2. Cough.  

3. Abnormal x-ray with nodular densities.  Rule out infection.  Rule out 

metastatic disease. 



The plan at the present time is to admit the patient to the hospital and 

get a chest CT.  We are going to get an infectious disease consult and a 

pulmonary consult.  All this was discussed with the patient, and all 

questions were answered to satisfaction.  









DD:  11/27/2018 10:24

DT:  11/27/2018 10:40

Job#:  [id number]

## 2018-11-28 VITALS — DIASTOLIC BLOOD PRESSURE: 72 MMHG | SYSTOLIC BLOOD PRESSURE: 150 MMHG

## 2018-11-28 VITALS — DIASTOLIC BLOOD PRESSURE: 61 MMHG | SYSTOLIC BLOOD PRESSURE: 125 MMHG

## 2018-11-28 VITALS — SYSTOLIC BLOOD PRESSURE: 140 MMHG | DIASTOLIC BLOOD PRESSURE: 67 MMHG

## 2018-11-28 VITALS — SYSTOLIC BLOOD PRESSURE: 145 MMHG | DIASTOLIC BLOOD PRESSURE: 71 MMHG

## 2018-11-28 VITALS — SYSTOLIC BLOOD PRESSURE: 146 MMHG | DIASTOLIC BLOOD PRESSURE: 70 MMHG

## 2018-11-28 VITALS — DIASTOLIC BLOOD PRESSURE: 66 MMHG | SYSTOLIC BLOOD PRESSURE: 127 MMHG

## 2018-11-28 VITALS — DIASTOLIC BLOOD PRESSURE: 70 MMHG | SYSTOLIC BLOOD PRESSURE: 140 MMHG

## 2018-11-28 LAB
ANION GAP SERPL CALC-SCNC: 16 MMOL/L (ref 8–16)
BUN SERPL-MCNC: 5 MG/DL (ref 7–26)
BUN/CREAT SERPL: 7 (ref 6–25)
CALCIUM SERPL-MCNC: 10 MG/DL (ref 8.4–10.2)
CHLORIDE SERPL-SCNC: 100 MMOL/L (ref 98–107)
CO2 SERPL-SCNC: 24 MMOL/L (ref 22–29)
EGFRCR SERPLBLD CKD-EPI 2021: > 60 ML/MIN (ref 60–?)
GLUCOSE SERPLBLD-MCNC: 299 MG/DL (ref 74–118)
POTASSIUM SERPL-SCNC: 3 MMOL/L (ref 3.5–5.1)
SODIUM SERPL-SCNC: 137 MMOL/L (ref 136–145)

## 2018-11-28 RX ADMIN — IPRATROPIUM BROMIDE AND ALBUTEROL SULFATE SCH ML: .5; 2.5 SOLUTION RESPIRATORY (INHALATION) at 15:00

## 2018-11-28 RX ADMIN — SODIUM CHLORIDE SCH MLS/HR: 9 INJECTION, SOLUTION INTRAVENOUS at 17:14

## 2018-11-28 RX ADMIN — IPRATROPIUM BROMIDE AND ALBUTEROL SULFATE SCH ML: .5; 2.5 SOLUTION RESPIRATORY (INHALATION) at 10:48

## 2018-11-28 RX ADMIN — Medication PRN MG: at 21:31

## 2018-11-28 RX ADMIN — IPRATROPIUM BROMIDE AND ALBUTEROL SULFATE SCH ML: .5; 2.5 SOLUTION RESPIRATORY (INHALATION) at 19:05

## 2018-11-28 RX ADMIN — VANCOMYCIN HYDROCHLORIDE SCH MG: 250 CAPSULE ORAL at 17:14

## 2018-11-28 RX ADMIN — METRONIDAZOLE SCH MLS/HR: 500 INJECTION, SOLUTION INTRAVENOUS at 21:29

## 2018-11-28 RX ADMIN — IPRATROPIUM BROMIDE AND ALBUTEROL SULFATE SCH ML: .5; 2.5 SOLUTION RESPIRATORY (INHALATION) at 06:49

## 2018-11-28 RX ADMIN — VANCOMYCIN HYDROCHLORIDE SCH MG: 250 CAPSULE ORAL at 06:11

## 2018-11-28 RX ADMIN — LEVOFLOXACIN SCH MLS/HR: 5 INJECTION, SOLUTION INTRAVENOUS at 00:25

## 2018-11-28 RX ADMIN — SODIUM CHLORIDE SCH MLS/HR: 9 INJECTION, SOLUTION INTRAVENOUS at 09:06

## 2018-11-28 RX ADMIN — VANCOMYCIN HYDROCHLORIDE SCH MG: 250 CAPSULE ORAL at 11:37

## 2018-11-28 RX ADMIN — METRONIDAZOLE SCH MLS/HR: 500 INJECTION, SOLUTION INTRAVENOUS at 06:10

## 2018-11-28 RX ADMIN — IPRATROPIUM BROMIDE AND ALBUTEROL SULFATE SCH ML: .5; 2.5 SOLUTION RESPIRATORY (INHALATION) at 03:02

## 2018-11-28 RX ADMIN — METRONIDAZOLE SCH MLS/HR: 500 INJECTION, SOLUTION INTRAVENOUS at 14:50

## 2018-11-28 NOTE — PROGRESS NOTE
DATE:  November 28, 2018 



Ms. Sanchez is a 57-year-old female with history of diabetes, hypertension, 

IBS, rheumatic fever.  She came to the emergency room complaining 11 days 

of diarrhea, nausea, vomiting and cough.  Stools came back positive for C. 

diff.  Chest x-ray and chest CT showed multiple nodules concerning for 

atypical infection.  Infectious disease and pulmonary were consulted.



PHYSICAL EXAMINATION

GENERAL:  Today, she is awake and alert.  She states she is feeling better.

VITAL SIGNS:  Temperature is 97.  Blood pressure is 145/71. 

HEART:  Regular rate. 

LUNGS:  Poor inspiratory effort. 

ABDOMEN:  Soft.



LABS:  On the blood work from yesterday, potassium is 2.7, creatinine 0.76, 

glucose 172.  White count 8.43, hemoglobin 10.8, hematocrit 31.1.  



Chest CT done yesterday shows multifocal central ground-glass nodules, 

peribronchial consolidations representing probably atypical infection to 

include mycobacterial and fungal etiologies.  Collagen vascular disease 

could not be ruled out.



ASSESSMENT AND PLAN

1. Clostridium difficile colitis.

2. Atypical pneumonia. 

3. Abdominal pain.

4. Hypertension.

5. Diabetes.



The plan at the present time is to continue IV antibiotics, ADA diet, 

sliding scale with insulin.  Continue blood pressure medications from home. 

 We are waiting for results on further serology testing like C. trachomatis 

and psittaci, HIV, mycoplasma.  



All those things are pending.  All this was discussed with the patient, and 

questions were answered to satisfaction.  









DD:  11/28/2018 09:04

DT:  11/28/2018 09:09

Job#:  [id number]

## 2018-11-28 NOTE — CONSULTATION
DATE OF CONSULTATION:    



PULMONARY CONSULTATION 



REASON FOR CONSULT:  Lung nodules.



CHIEF COMPLAINT:  Cough, shortness of breath, nausea and vomiting.



HISTORY OF PRESENT ILLNESS:  Ms. Sanchez is a 58-year-old female.  She has a 

history of diabetes, high blood pressure, irritable bowel syndrome.  She 

came into the emergency room.  She reports that for the last 2 weeks she 

has been extremely sick with nausea, vomiting, fever, lethargy.  She has 

received Tamiflu by her primary care physician with no benefit, and she 

became increasingly weak, lethargic and was throwing up with coughing; so, 

she came to the emergency room.  She denies any complaints of chest pain.  

She was having nausea, vomiting and diarrhea.



REVIEW OF SYSTEMS:   

GENERAL:  She was having fever and chills.

HEAD:  Denies any head trauma.  

ENT:  Denies any earache.  

CVS:  Denies any chest pain.

RESPIRATORY:  No shortness of breath. 

GI:  Nausea and vomiting.  

REST OF THE REVIEW OF SYSTEMS:  Negative except as in history of present 

illness. 



PAST MEDICAL HISTORY:  Hypertension, diabetes, irritable bowel syndrome.



PAST SURGICAL HISTORY:  Hysterectomy, cholecystectomy, tonsillectomy.



FAMILY AND SOCIAL HISTORY:  She occasionally smokes, does not drink.  Works 

as a .



PHYSICAL EXAMINATION:   

VITALS:  Temperature 97.4, pulse of 86, blood pressure 146/70, respiratory 

rate of 18. 

HEENT:  Head atraumatic, normocephalic.  

NECK:  Supple.

CHEST:  Clear to auscultation bilaterally.  No wheezing.

HEART:  S1 and S2 audible. 

ABDOMEN:  Soft, nontender.

EXTREMITIES:  No pedal edema.

NEUROLOGIC:  Awake, alert.  No focal neurologic deficit. 



LABORATORY DATA:  White count of 8000.  Hemoglobin 10.8.  Platelets 373.



CHEMISTRY:  Sodium 137, potassium 3.0, chloride 100, BUN 5, creatinine 

0.75.  



COMPUTED TOMOGRAPHY OF THE CHEST:  I have reviewed the images in detail.  

Patient has small nodules which appear that either she has aspirated or it 

is small mucoid impactions in small airways versus pneumonia and 

bronchitis.



ASSESSMENT AND PLAN:  Ms. Sanchez is a 58-year-old female with a history of 

irritable bowel syndrome, hypertension, diabetes, presented with cough, 

nausea, vomiting and lethargy.  Looks like a viral syndrome.  Patient is 

being treated with IV antibiotics.  CT chest shows interesting incidental 

finding of small nodules.



CURRENT PROBLEMS:  

1. Lung nodules which are less than 1 cm.

2. Clostridium difficile colitis.

3. Hypertension.

4. Diabetes.



PLAN:  The findings in the lung are showing small nodules.  These are 

subcentimeter nodules and do not need biopsy at this point, and it appears 

that it is probably due to the viral infection.  There are 1 or 2 nodular 

configurations which are more than 1 cm; however, they do not appear to be 

typical lung nodules.  I would recommend a 3-month followup CAT scan to 

document the resolution of these nodules.  In the meantime, I will 

recommend continuing the treatment for pneumonia and C. diff colitis.  



Thank you for this consult.  



 





DD:  11/28/2018 17:19

DT:  11/28/2018 17:31

Job#:  [id number]

## 2018-11-29 VITALS — SYSTOLIC BLOOD PRESSURE: 147 MMHG | DIASTOLIC BLOOD PRESSURE: 76 MMHG

## 2018-11-29 VITALS — SYSTOLIC BLOOD PRESSURE: 152 MMHG | DIASTOLIC BLOOD PRESSURE: 79 MMHG

## 2018-11-29 VITALS — SYSTOLIC BLOOD PRESSURE: 150 MMHG | DIASTOLIC BLOOD PRESSURE: 72 MMHG

## 2018-11-29 VITALS — SYSTOLIC BLOOD PRESSURE: 177 MMHG | DIASTOLIC BLOOD PRESSURE: 85 MMHG

## 2018-11-29 VITALS — DIASTOLIC BLOOD PRESSURE: 73 MMHG | SYSTOLIC BLOOD PRESSURE: 143 MMHG

## 2018-11-29 VITALS — SYSTOLIC BLOOD PRESSURE: 146 MMHG | DIASTOLIC BLOOD PRESSURE: 71 MMHG

## 2018-11-29 LAB
ANION GAP SERPL CALC-SCNC: 15.1 MMOL/L (ref 8–16)
BUN SERPL-MCNC: < 5 MG/DL (ref 7–26)
BUN/CREAT SERPL: 8 (ref 6–25)
CALCIUM SERPL-MCNC: 9.5 MG/DL (ref 8.4–10.2)
CHLORIDE SERPL-SCNC: 104 MMOL/L (ref 98–107)
CO2 SERPL-SCNC: 24 MMOL/L (ref 22–29)
EGFRCR SERPLBLD CKD-EPI 2021: > 60 ML/MIN (ref 60–?)
GLUCOSE SERPLBLD-MCNC: 219 MG/DL (ref 74–118)
POTASSIUM SERPL-SCNC: 3.1 MMOL/L (ref 3.5–5.1)
SODIUM SERPL-SCNC: 140 MMOL/L (ref 136–145)

## 2018-11-29 RX ADMIN — SODIUM CHLORIDE PRN MG: 900 INJECTION INTRAVENOUS at 04:58

## 2018-11-29 RX ADMIN — Medication PRN MG: at 13:53

## 2018-11-29 RX ADMIN — SODIUM CHLORIDE SCH MLS/HR: 9 INJECTION, SOLUTION INTRAVENOUS at 17:37

## 2018-11-29 RX ADMIN — LEVOFLOXACIN SCH MLS/HR: 5 INJECTION, SOLUTION INTRAVENOUS at 00:02

## 2018-11-29 RX ADMIN — IPRATROPIUM BROMIDE AND ALBUTEROL SULFATE SCH ML: .5; 2.5 SOLUTION RESPIRATORY (INHALATION) at 00:06

## 2018-11-29 RX ADMIN — VANCOMYCIN HYDROCHLORIDE SCH MG: 250 CAPSULE ORAL at 06:24

## 2018-11-29 RX ADMIN — IPRATROPIUM BROMIDE AND ALBUTEROL SULFATE SCH ML: .5; 2.5 SOLUTION RESPIRATORY (INHALATION) at 03:00

## 2018-11-29 RX ADMIN — METRONIDAZOLE SCH MLS/HR: 500 INJECTION, SOLUTION INTRAVENOUS at 14:00

## 2018-11-29 RX ADMIN — IPRATROPIUM BROMIDE AND ALBUTEROL SULFATE SCH ML: .5; 2.5 SOLUTION RESPIRATORY (INHALATION) at 07:28

## 2018-11-29 RX ADMIN — VANCOMYCIN HYDROCHLORIDE SCH MG: 250 CAPSULE ORAL at 13:29

## 2018-11-29 RX ADMIN — METRONIDAZOLE SCH MLS/HR: 500 INJECTION, SOLUTION INTRAVENOUS at 06:24

## 2018-11-29 RX ADMIN — VANCOMYCIN HYDROCHLORIDE SCH MG: 250 CAPSULE ORAL at 19:26

## 2018-11-29 RX ADMIN — SODIUM CHLORIDE SCH MLS/HR: 9 INJECTION, SOLUTION INTRAVENOUS at 13:53

## 2018-11-29 RX ADMIN — SODIUM CHLORIDE SCH MLS/HR: 9 INJECTION, SOLUTION INTRAVENOUS at 06:24

## 2018-11-29 RX ADMIN — IPRATROPIUM BROMIDE AND ALBUTEROL SULFATE SCH ML: .5; 2.5 SOLUTION RESPIRATORY (INHALATION) at 20:45

## 2018-11-29 RX ADMIN — IPRATROPIUM BROMIDE AND ALBUTEROL SULFATE SCH ML: .5; 2.5 SOLUTION RESPIRATORY (INHALATION) at 23:00

## 2018-11-29 RX ADMIN — Medication PRN MG: at 21:15

## 2018-11-29 RX ADMIN — IPRATROPIUM BROMIDE AND ALBUTEROL SULFATE SCH ML: .5; 2.5 SOLUTION RESPIRATORY (INHALATION) at 11:20

## 2018-11-29 RX ADMIN — VANCOMYCIN HYDROCHLORIDE SCH MG: 250 CAPSULE ORAL at 00:02

## 2018-11-29 RX ADMIN — METRONIDAZOLE SCH MLS/HR: 500 INJECTION, SOLUTION INTRAVENOUS at 21:07

## 2018-11-29 RX ADMIN — IPRATROPIUM BROMIDE AND ALBUTEROL SULFATE SCH ML: .5; 2.5 SOLUTION RESPIRATORY (INHALATION) at 14:50

## 2018-11-29 NOTE — PROGRESS NOTE
DATE:  November 29, 2018 



Ms. Sanchez is a 57-year-old female with history of diabetes, hypertension, 

IBS.  She came to the emergency room complaining of 11 days of diarrhea, 

nausea, vomiting and cough.  She was found to be C. diff positive.   CT of 

the chest showed multiple nodules probably secondary to atypical infection. 

 She is getting 3 IV antibiotics. 



PHYSICAL EXAMINATION

GENERAL:  Today, she is awake and alert.  She is feeling a little better.

VITAL SIGNS:  Temperature is 98.4.  Blood pressure is 147/76. 

HEART:  Regular rate. 

LUNGS:  Clear to auscultation. 

ABDOMEN:  Soft.



LABS:  On the blood work, white count 8.43, hemoglobin 10.8, potassium 3.1 

today, glucose 219.  Serology still pending.  C. diff positive.  HIV 

negative.  The other serology is pending. Cultures so far have been 

negative.



ASSESSMENT AND PLAN

1. Clostridium difficile colitis.

2. Atypical pneumonia. 

3. Abdominal pain, resolved.

4. Hypertension.

5. Diabetes, type 2.



The plan at the present time is to continue IV antibiotics, ADA diet, 

sliding scale with insulin.  We are going to replace potassium and monitor 

in the morning.  



All of this was discussed with the patient, and all questions were answered 

to satisfaction.  







DD:  11/29/2018 09:21

DT:  11/29/2018 09:45

Job#:  [id number]

## 2018-11-30 VITALS — SYSTOLIC BLOOD PRESSURE: 165 MMHG | DIASTOLIC BLOOD PRESSURE: 74 MMHG

## 2018-11-30 VITALS — DIASTOLIC BLOOD PRESSURE: 79 MMHG | SYSTOLIC BLOOD PRESSURE: 145 MMHG

## 2018-11-30 VITALS — SYSTOLIC BLOOD PRESSURE: 143 MMHG | DIASTOLIC BLOOD PRESSURE: 70 MMHG

## 2018-11-30 VITALS — DIASTOLIC BLOOD PRESSURE: 70 MMHG | SYSTOLIC BLOOD PRESSURE: 143 MMHG

## 2018-11-30 VITALS — DIASTOLIC BLOOD PRESSURE: 90 MMHG | SYSTOLIC BLOOD PRESSURE: 176 MMHG

## 2018-11-30 VITALS — SYSTOLIC BLOOD PRESSURE: 154 MMHG | DIASTOLIC BLOOD PRESSURE: 73 MMHG

## 2018-11-30 LAB
ANION GAP SERPL CALC-SCNC: 13.3 MMOL/L (ref 8–16)
BUN SERPL-MCNC: < 5 MG/DL (ref 7–26)
BUN/CREAT SERPL: 8 (ref 6–25)
CALCIUM SERPL-MCNC: 9 MG/DL (ref 8.4–10.2)
CHLORIDE SERPL-SCNC: 108 MMOL/L (ref 98–107)
CO2 SERPL-SCNC: 23 MMOL/L (ref 22–29)
EGFRCR SERPLBLD CKD-EPI 2021: > 60 ML/MIN (ref 60–?)
GLUCOSE SERPLBLD-MCNC: 202 MG/DL (ref 74–118)
POTASSIUM SERPL-SCNC: 4.3 MMOL/L (ref 3.5–5.1)
SODIUM SERPL-SCNC: 140 MMOL/L (ref 136–145)

## 2018-11-30 RX ADMIN — VANCOMYCIN HYDROCHLORIDE SCH MG: 250 CAPSULE ORAL at 13:05

## 2018-11-30 RX ADMIN — METRONIDAZOLE SCH MLS/HR: 500 INJECTION, SOLUTION INTRAVENOUS at 14:56

## 2018-11-30 RX ADMIN — IPRATROPIUM BROMIDE AND ALBUTEROL SULFATE SCH ML: .5; 2.5 SOLUTION RESPIRATORY (INHALATION) at 10:33

## 2018-11-30 RX ADMIN — IPRATROPIUM BROMIDE AND ALBUTEROL SULFATE SCH ML: .5; 2.5 SOLUTION RESPIRATORY (INHALATION) at 03:00

## 2018-11-30 RX ADMIN — VANCOMYCIN HYDROCHLORIDE SCH MG: 250 CAPSULE ORAL at 17:54

## 2018-11-30 RX ADMIN — IPRATROPIUM BROMIDE AND ALBUTEROL SULFATE SCH ML: .5; 2.5 SOLUTION RESPIRATORY (INHALATION) at 06:53

## 2018-11-30 RX ADMIN — SODIUM CHLORIDE SCH MLS/HR: 9 INJECTION, SOLUTION INTRAVENOUS at 17:26

## 2018-11-30 RX ADMIN — METRONIDAZOLE SCH MLS/HR: 500 INJECTION, SOLUTION INTRAVENOUS at 05:33

## 2018-11-30 RX ADMIN — LEVOFLOXACIN SCH MLS/HR: 5 INJECTION, SOLUTION INTRAVENOUS at 00:00

## 2018-11-30 RX ADMIN — IPRATROPIUM BROMIDE AND ALBUTEROL SULFATE SCH ML: .5; 2.5 SOLUTION RESPIRATORY (INHALATION) at 14:52

## 2018-11-30 RX ADMIN — VANCOMYCIN HYDROCHLORIDE SCH MG: 250 CAPSULE ORAL at 05:33

## 2018-11-30 RX ADMIN — SODIUM CHLORIDE SCH MLS/HR: 9 INJECTION, SOLUTION INTRAVENOUS at 09:28

## 2018-11-30 RX ADMIN — SODIUM CHLORIDE SCH MLS/HR: 9 INJECTION, SOLUTION INTRAVENOUS at 04:35

## 2018-11-30 RX ADMIN — VANCOMYCIN HYDROCHLORIDE SCH MG: 250 CAPSULE ORAL at 00:00

## 2018-11-30 NOTE — PROGRESS NOTE
DATE:  November 30, 2018 



Ms. Sanchez is a 57-year-old female with history of diabetes and hypertension. 

 She came to the emergency room with nausea, vomiting, diarrhea and cough 

for 11 days.  She was C. diff positive.  CT of the chest showed multiple 

nodules probably secondary to some type of atypical infection.  She is on 

IV antibiotics, and apparently she is going to need antibiotics for 8 

weeks. 



PHYSICAL EXAMINATION

GENERAL:  She is awake and alert.  

VITAL SIGNS:  Temperature is 96.5.  Blood pressure is 143/70. 

HEART:  Regular rate. 

LUNGS:  Clear to auscultation. 

ABDOMEN:  Soft.



LABS:  On the blood work, potassium is 4.3, creatinine 0.62, glucose 202.  

Serology is pending. C. diff was positive.  



ASSESSMENT AND PLAN

1. Clostridium difficile colitis.

2. Atypical pneumonia. 

3. Abdominal pain.

4. Hypertension.

5. Diabetes, type 2.



The plan at the present time is to continue ADA diet, sliding scale with 

insulin, continue IV antibiotics.  We are going to discuss with Dr. Vee 

the duration of the IV antibiotics and see if the patient will qualify for 

LTAC versus home with IV antibiotics.  



All of this was discussed with the patient, and all questions were answered 

to satisfaction.  









DD:  11/30/2018 08:49

DT:  11/30/2018 08:57

Job#:  [id number]

## 2020-09-21 ENCOUNTER — HOSPITAL ENCOUNTER (EMERGENCY)
Dept: HOSPITAL 88 - ER | Age: 60
Discharge: HOME | End: 2020-09-21
Payer: COMMERCIAL

## 2020-09-21 VITALS — HEIGHT: 63 IN | WEIGHT: 174 LBS | BODY MASS INDEX: 30.83 KG/M2

## 2020-09-21 VITALS — DIASTOLIC BLOOD PRESSURE: 77 MMHG | SYSTOLIC BLOOD PRESSURE: 145 MMHG

## 2020-09-21 DIAGNOSIS — E11.9: ICD-10-CM

## 2020-09-21 DIAGNOSIS — R53.83: Primary | ICD-10-CM

## 2020-09-21 DIAGNOSIS — E78.5: ICD-10-CM

## 2020-09-21 DIAGNOSIS — I10: ICD-10-CM

## 2020-09-21 LAB
ALBUMIN SERPL-MCNC: 5.3 G/DL (ref 3.5–5)
ALBUMIN/GLOB SERPL: 2 {RATIO} (ref 0.8–2)
ALP SERPL-CCNC: 69 IU/L (ref 40–150)
ALT SERPL-CCNC: 31 IU/L (ref 0–55)
ANION GAP SERPL CALC-SCNC: 15.5 MMOL/L (ref 8–16)
BACTERIA URNS QL MICRO: (no result) /HPF
BASOPHILS # BLD AUTO: 0.1 10*3/UL (ref 0–0.1)
BASOPHILS NFR BLD AUTO: 0.5 % (ref 0–1)
BILIRUB UR QL: NEGATIVE
BUN SERPL-MCNC: 14 MG/DL (ref 7–26)
BUN/CREAT SERPL: 18 (ref 6–25)
CALCIUM SERPL-MCNC: 10.4 MG/DL (ref 8.4–10.2)
CHLORIDE SERPL-SCNC: 103 MMOL/L (ref 98–107)
CK MB SERPL-MCNC: 1.1 NG/ML (ref 0–5)
CK SERPL-CCNC: 143 IU/L (ref 29–168)
CLARITY UR: CLEAR
CO2 SERPL-SCNC: 24 MMOL/L (ref 22–29)
COLOR UR: YELLOW
DEPRECATED APTT PLAS QN: 30.5 SECONDS (ref 23.8–35.5)
DEPRECATED INR PLAS: 0.9
DEPRECATED NEUTROPHILS # BLD AUTO: 6.8 10*3/UL (ref 2.1–6.9)
DEPRECATED RBC URNS MANUAL-ACNC: (no result) /HPF (ref 0–5)
EGFRCR SERPLBLD CKD-EPI 2021: > 60 ML/MIN (ref 60–?)
EOSINOPHIL # BLD AUTO: 0.3 10*3/UL (ref 0–0.4)
EOSINOPHIL NFR BLD AUTO: 2.2 % (ref 0–6)
EPI CELLS URNS QL MICRO: (no result) /LPF
ERYTHROCYTE [DISTWIDTH] IN CORD BLOOD: 12.8 % (ref 11.7–14.4)
GLOBULIN PLAS-MCNC: 2.7 G/DL (ref 2.3–3.5)
GLUCOSE SERPLBLD-MCNC: 129 MG/DL (ref 74–118)
HCT VFR BLD AUTO: 39.7 % (ref 34.2–44.1)
HGB BLD-MCNC: 13 G/DL (ref 12–16)
KETONES UR QL STRIP.AUTO: NEGATIVE
LEUKOCYTE ESTERASE UR QL STRIP.AUTO: NEGATIVE
LYMPHOCYTES # BLD: 3.4 10*3/UL (ref 1–3.2)
LYMPHOCYTES NFR BLD AUTO: 29.5 % (ref 18–39.1)
MCH RBC QN AUTO: 28 PG (ref 28–32)
MCHC RBC AUTO-ENTMCNC: 32.7 G/DL (ref 31–35)
MCV RBC AUTO: 85.4 FL (ref 81–99)
MONOCYTES # BLD AUTO: 1 10*3/UL (ref 0.2–0.8)
MONOCYTES NFR BLD AUTO: 8.6 % (ref 4.4–11.3)
NEUTS SEG NFR BLD AUTO: 58.8 % (ref 38.7–80)
NITRITE UR QL STRIP.AUTO: NEGATIVE
PLATELET # BLD AUTO: 370 X10E3/UL (ref 140–360)
POTASSIUM SERPL-SCNC: 3.5 MMOL/L (ref 3.5–5.1)
PROT UR QL STRIP.AUTO: NEGATIVE
PROTHROMBIN TIME: 12.6 SECONDS (ref 11.9–14.5)
RBC # BLD AUTO: 4.65 X10E6/UL (ref 3.6–5.1)
SODIUM SERPL-SCNC: 139 MMOL/L (ref 136–145)
SP GR UR STRIP: 1.02 (ref 1.01–1.02)
UROBILINOGEN UR STRIP-MCNC: 0.2 MG/DL (ref 0.2–1)
WBC #/AREA URNS HPF: (no result) /HPF (ref 0–5)

## 2020-09-21 PROCEDURE — 80053 COMPREHEN METABOLIC PANEL: CPT

## 2020-09-21 PROCEDURE — 36415 COLL VENOUS BLD VENIPUNCTURE: CPT

## 2020-09-21 PROCEDURE — 99284 EMERGENCY DEPT VISIT MOD MDM: CPT

## 2020-09-21 PROCEDURE — 70450 CT HEAD/BRAIN W/O DYE: CPT

## 2020-09-21 PROCEDURE — 82550 ASSAY OF CK (CPK): CPT

## 2020-09-21 PROCEDURE — 71045 X-RAY EXAM CHEST 1 VIEW: CPT

## 2020-09-21 PROCEDURE — 93005 ELECTROCARDIOGRAM TRACING: CPT

## 2020-09-21 PROCEDURE — 85025 COMPLETE CBC W/AUTO DIFF WBC: CPT

## 2020-09-21 PROCEDURE — 85610 PROTHROMBIN TIME: CPT

## 2020-09-21 PROCEDURE — 82553 CREATINE MB FRACTION: CPT

## 2020-09-21 PROCEDURE — 85730 THROMBOPLASTIN TIME PARTIAL: CPT

## 2020-09-21 PROCEDURE — 81001 URINALYSIS AUTO W/SCOPE: CPT

## 2020-09-21 PROCEDURE — 84484 ASSAY OF TROPONIN QUANT: CPT

## 2020-09-21 PROCEDURE — 87086 URINE CULTURE/COLONY COUNT: CPT

## 2020-09-21 NOTE — EMERGENCY DEPARTMENT NOTE
History of Present Illnes


History of Present Illness


Chief Complaint:  General Medicine Complaints


History of Present Illness


This is a 59 year old  female Chief Complaint Comment PATIENT IN FROM 

HOME WITH COMPLAINTS OF FATIGUE, SHORTNESS OF BREATH, DRY COUGH, HEADACHE, AND 

DIZZINESS OFF AND ON SINCE FRIDAY; STATES THAT SHE STARTED LIPITOR ON TUESDAY.  

DENIES PAIN.


Historian:  Patient


Arrival Mode:  Car


 Required:  No


Onset (how long ago):  day(s) (3)


Location:  Generalized


Quality:  fatigue


Radiation:  Reports non-radiation


Severity:  mild


Onset quality:  unable to specify


Duration (how long):  day(s) (3)


Timing of current episode:  sporadic


Progression:  unchanged


Chronicity:  new


Context:  Denies recent illness, Denies recent surgery


Relieving factors:  none


Exacerbating factors:  none


Associated symptoms:  Reports denies other symptoms


Treatments prior to arrival:  none





Past Medical/Family History


Physician Review


I have reviewed the patient's past medical and family history.  Any updates have

been documented here.





Past Medical History


Recent Fever:  No


Clinical Suspicion of Infectio:  No


New/Unexplained Change in Ment:  No


Past Medical History:  Hypertension, Diabetes, Other Mental Illness, 

Hyperlipedemia, Chronic Back Pain


Other Medical History:  


SCOLIOSIS, SPINAL STENOSIS, IBS, MVAX2


Past Surgical History:  Hysterectomy, T&A, 





Social History


Smoking Cessation:  Unknown if ever smoked


Counseling Performed:  No


Alcohol Use:  None


Any Illegal Drug Use:  No





Other


Last Tetanus:  7 YEARS


Any Pre-Existing Lines (PICC,:  No





Review of Systems


Review of Systems


Constitutional:  Reports as per HPI


EENTM:  Reports no symptoms


Cardiovascular:  Reports no symptoms


Respiratory:  Reports no symptoms


Gastrointestinal:  Reports no symptoms


Genitourinary:  Reports no symptoms


Musculoskeletal:  Reports no symptoms


Integumentary:  Reports no symptoms


Neurological:  Reports headache, Reports weakness


Psychological:  Reports no symptoms


Endocrine:  Reports no symptoms


Hematological/Lymphatic:  Reports no symptoms





Physical Exam


Related Data


Allergies:  


Coded Allergies:  


     Shellfish (Verified  Allergy, Unknown, 20)


     Sulfa (Sulfonamide Antibiotics) (Verified  Allergy, Unknown, 20)


     carisoprodol (Verified  Allergy, Unknown, 20)


     egg (Verified  Allergy, Unknown, 20)


Triage Vital Signs





Vital Signs








  Date Time  Temp Pulse Resp B/P (MAP) Pulse Ox O2 Delivery O2 Flow Rate FiO2


 


20 17:29 98.9 68 20 191/93 99 Room Air  








Vital signs reviewed:  Yes





Physical Exam


CONSTITUTIONAL





Constitutional:  Present well-developed, Present well-nourished


HENT


HENT:  Present normocephalic, Present atraumatic, Present oropharynx 

clear/moist, Present nose normal


HENT L/R:  Present left ext ear normal, Present right ext ear normal


EYES





Eyes:  Reports PERRL, Reports conjunctivae normal


NECK


Neck:  Present ROM normal


PULMONARY


Pulmonary:  Present effort normal, Present breath sounds normal


CARDIOVASCULAR





Cardiovascular:  Present regular rhythm, Present heart sounds normal, Present c

apillary refill normal, Present normal rate


GASTROINTESTINAL





Abdominal:  Present soft, Present nontender, Present bowel sounds normal


GENITOURINARY





Genitourinary:  Present exam deferred


SKIN


Skin:  Present warm, Present dry


MUSCULOSKELETAL





Musculoskeletal:  Present ROM normal


NEUROLOGICAL





Neurological:  Present alert, Present oriented x 3, Present no gross motor or 

sensory deficits; 


   Absent cranial nerve deficit, Absent weakness


PSYCHOLOGICAL


Psychological:  Present mood/affect normal, Present judgement normal





Results


Laboratory


Result Diagram:  


20 1740                                                                    

           20 1740





Laboratory





Laboratory Tests








Test


 20


17:40


 


White Blood Count


 11.61 x10e3/uL


(4.8-10.8)


 


Red Blood Count


 4.65 x10e6/uL


(3.6-5.1)


 


Hemoglobin


 13.0 g/dL


(12.0-16.0)


 


Hematocrit


 39.7 %


(34.2-44.1)


 


Mean Corpuscular Volume


 85.4 fL


(81-99)


 


Mean Corpuscular Hemoglobin


 28.0 pg


(28-32)


 


Mean Corpuscular Hemoglobin


Concent 32.7 g/dL


(31-35)


 


Red Cell Distribution Width


 12.8 %


(11.7-14.4)


 


Platelet Count


 370 x10e3/uL


(140-360)


 


Neutrophils (%) (Auto)


 58.8 %


(38.7-80.0)


 


Lymphocytes (%) (Auto)


 29.5 %


(18.0-39.1)


 


Monocytes (%) (Auto)


 8.6  %


(4.4-11.3)


 


Eosinophils (%) (Auto)


 2.2 %


(0.0-6.0)


 


Basophils (%) (Auto)


 0.5 %


(0.0-1.0)


 


Neutrophils # (Auto) 6.8 (2.1-6.9) 


 


Lymphocytes # (Auto) 3.4 (1.0-3.2) 


 


Monocytes # (Auto) 1.0 (0.2-0.8) 


 


Eosinophils # (Auto) 0.3 (0.0-0.4) 


 


Basophils # (Auto) 0.1 (0.0-0.1) 


 


Absolute Immature Granulocyte


(auto 0.05 x10e3/uL


(0-0.1)


 


Prothrombin Time


 12.6 seconds


(11.9-14.5)


 


Prothromb Time International


Ratio 0.90 





 


Activated Partial


Thromboplast Time 30.5 seconds


(23.8-35.5)


 


Urine Color


 Yellow


(YELLOW)


 


Urine Clarity Clear (CLEAR) 


 


Urine pH 6 (5 - 7) 


 


Urine Specific Gravity


 1.025


(1.010-1.025)


 


Urine Protein


 Negative


(NEGATIVE)


 


Urine Glucose (UA)


 Negative


(NEGATIVE)


 


Urine Ketones


 Negative


(NEGATIVE)


 


Urine Blood


 Negative


(NEGATIVE)


 


Urine Nitrite


 Negative


(NEGATIVE)


 


Urine Bilirubin


 Negative


(NEGATIVE)


 


Urine Urobilinogen


 0.2 mg/dL (0.2


- 1)


 


Urine Leukocyte Esterase


 Negative


(NEGATIVE)


 


Urine RBC 0-5 /HPF (0-5) 


 


Urine WBC 0-5 /HPF (0-5) 


 


Urine Epithelial Cells


 None /LPF


(NONE)


 


Urine Bacteria


 None /HPF


(NONE)


 


Sodium Level


 139 mmol/L


(136-145)


 


Potassium Level


 3.5 mmol/L


(3.5-5.1)


 


Chloride Level


 103 mmol/L


()


 


Carbon Dioxide Level


 24 mmol/L


(22-29)


 


Anion Gap


 15.5 mmol/L


(8-16)


 


Blood Urea Nitrogen


 14 mg/dL


(7-26)


 


Creatinine


 0.77 mg/dL


(0.57-1.11)


 


Estimat Glomerular Filtration


Rate > 60 ML/MIN


(60-)


 


BUN/Creatinine Ratio 18 (6-25) 


 


Glucose Level


 129 mg/dL


()


 


Calcium Level


 10.4 mg/dL


(8.4-10.2)


 


Total Bilirubin


 0.4 mg/dL


(0.2-1.2)


 


Aspartate Amino Transf


(AST/SGOT) 29 IU/L (5-34) 





 


Alanine Aminotransferase


(ALT/SGPT) 31 IU/L (0-55) 





 


Alkaline Phosphatase


 69 IU/L


()


 


Creatine Kinase


 143 IU/L


()


 


Creatine Kinase MB


 1.10 ng/mL


(0-5.0)


 


Troponin I


 0.010 ng/mL


(0-0.300)


 


Total Protein


 8.0 g/dL


(6.5-8.1)


 


Albumin


 5.3 g/dL


(3.5-5.0)


 


Globulin


 2.7 g/dL


(2.3-3.5)


 


Albumin/Globulin Ratio 2.0 (0.8-2.0) 








Lab results reviewed:  Yes





Imaging


Imaging results reviewed:  Yes





Diagnostics Tests


Diagnostic test(s) reviewed:  Yes





Procedures


12 Lead ECG Interpretation


ECG Interpretation :  


   :  Interpreted by ED physician


   Date:  Sep 21, 2020


   Rhythm:  sinus rhythm


   Rate:  normal


   QRS axis:  normal


   ST segments normal:  Yes


   T waves normal:  Yes


   Clinical Impression:  normal ECG





Assessment & Plan


Medical Decision Making


MDM


59 y.o F presents for multiple complaints. Initial differential includes 

dehydration, ACS, electrolyte abnormality, medication reaction among others. 

Labs, CT and exam are all unremarkable. Doubt emergent process at this time. 

Discused with her the results and that she should follow up with her primary 

doctor for further medication recommendations. She will DC her lipitor for now 

until she can follow up with her PCP.





Reassessment


Reassessment time:  19:00


Reassessment


Well appearing, NAD





Assessment & Plan


Final Impression:  


(1) Fatigue


Depart Disposition:  HOME, SELF-CARE


Last Vital Signs











  Date Time  Temp Pulse Resp B/P (MAP) Pulse Ox O2 Delivery O2 Flow Rate FiO2


 


20 18:15    181/86    


 


20 18:12  71 16  100 Room Air  


 


20 17:29 98.9       








Home Meds


Reported Medications


Fluticasone Propionate (FLUTICASONE PROPIONATE) 16 Gm Spray.susp, 2 INH INH 

DAILY


   2 puffs in each nostril


   18


Ondansetron Hcl* (ZOFRAN*) 4 Mg Tablet, 4 MG PO Q6H PRN for NAUSEA


   18


Meloxicam (MELOXICAM) 7.5 Mg Tablet, 7.5 MG PO DAILY PRN for PAIN, #30 TAB


   18


Dicyclomine Hcl (DICYCLOMINE HCL) 10 Mg Capsule, 10 MG PO TID


   18


Montelukast Sodium (SINGULAIR) 10 Mg Tablet, 10 MG PO DAILY


   18


Glipizide (GLIPIZIDE) 5 Mg Tablet, 10 MG PO DAILY, TAB


   18


Gabapentin (GABAPENTIN) 300 Mg Capsule, 300 MG PO BID, #60 CAP


   18


Tizanidine Hcl (TIZANIDINE HCL) 4 Mg Tablet, 2 MG PO BEDTIME, TAB


   1-2 tablets


   


   14


Albuterol Sulf* (PROAIR HFA INHALER*) 8.5 Gm Inh, 2 INH INH Q6 PRN for WHEEZING


   2 puffs


   14


Fenofibrate (TRICOR) 48 Mg Tab, 48 MG PO DAILY, #30 TAB


   14


Metoprolol Tartrate (METOPROLOL TARTRATE) 50 Mg Tablet, 50 MG PO BID, TAB


   14


Hydrochlorothiazide (Hydrochlorothiazide) 12.5 Mg Capsule, 25 MG PO DAILY


   12


Aspirin (Asa) 81 Mg Tab, 81 MG PO DAILY


   12


Metformin Hcl (Glucophage) 500 Mg Tablet, 1000 MG PO BID


   12


Sertraline Hcl (Zoloft) 25 Mg Tablet, 50 MG PO DAILY


   12


Losartan Potassium (Cozaar) 50 Mg Tablet, 50 MG PO BID


   12


Medications in the ED





Sodium Chloride 1,000 ml @  0 mls/hr Q0M STAT IV  Last administered on 20at

18:15; Admin Dose 1,000 MLS/HR;  Start 20 at 17:39;  Stop 20 at 17:42;

 Status DC


Hydralazine HCl 10 mg NOW  STAT IV  Last administered on 20at 18:15; Admin 

Dose 10 MG;  Start 20 at 17:39;  Stop 20 at 17:44;  Status DC











USMAN HADLEY MD          Sep 21, 2020 19:00

## 2020-09-21 NOTE — DIAGNOSTIC IMAGING REPORT
CT BRAIN WO



HISTORY: Headache, dizziness



COMPARISON:  None.



TECHNIQUE: 

Noncontrast axial scans were obtained from skull base to the vertex.  Coronal

and sagittal reconstructions obtained from the axial data.  One or more of the

following dose reduction techniques were used: Automated exposure control,

adjustment of the mA and/or kV according to patient size, and/or utilization of

iterative reconstruction technique.



DISCUSSION:



Scalp/Skull: Unremarkable.

Brain sulci: Appropriate for patient's age.

Ventricles: Normal in size and configuration.  No hydrocephalus.

Extra-axial spaces: No masses or fluid collections.  Mild carotid siphon

calcifications are present.



Parenchyma: 

No abnormal densities.

No mass, hemorrhage, or large vascular territory acute infarct.



Dural sinuses:  No abnormal densities.

Sellar/Suprasellar region: Intact.

Skull base: Intact.

Incidental findings: None.



IMPRESSION:



No acute intracranial abnormalities.

   



Signed by: Dr. Jim Mcarthur M.D. on 9/21/2020 7:09 PM

## 2020-09-21 NOTE — DIAGNOSTIC IMAGING REPORT
EXAMINATION:  CHEST SINGLE (PORTABLE)    



INDICATION: MALAISE, HEADACHE, DIZZY



COMPARISON:  Chest CT on 11/27/2018. Chest radiograph on 11/26/2018.

     

FINDINGS:    



TUBES and LINES:  None.



LUNGS:  Normal lung volumes.  Lungs are clear.  No consolidations.



PLEURA:  No pleural effusion or pneumothorax.



HEART AND MEDIASTINUM:  The cardiomediastinal silhouette is unremarkable.    



BONES AND SOFT TISSUES:  No acute osseous lesion.  Soft tissues are

unremarkable.



UPPER ABDOMEN: No free air under the diaphragm.    



IMPRESSION: 



No acute thoracic radiographic abnormality.





Signed by: Chan Glover MD on 9/21/2020 7:09 PM

## 2020-09-21 NOTE — NUR
Pt was ambulatory steady gait without difficulty, but when called to room, pt 
shuffling and slow. Wheelchair brought to pt, however...pt grabbed male 
 and ask him to let her hold on to his arm to room. Pt declined 
wheelchair strongly. 



Pt behavior in triage abrupt and off. Pt states, "where's the little 
[facility] he's really good."

Explained to pt we did not have anyone here with that ethniticity. She then 
said, "well, i guess the girls can do it."

## 2021-04-27 ENCOUNTER — HOSPITAL ENCOUNTER (EMERGENCY)
Dept: HOSPITAL 88 - ER | Age: 61
Discharge: HOME | End: 2021-04-27
Payer: COMMERCIAL

## 2021-04-27 VITALS — BODY MASS INDEX: 30.83 KG/M2 | HEIGHT: 63 IN | WEIGHT: 174 LBS

## 2021-04-27 VITALS — SYSTOLIC BLOOD PRESSURE: 138 MMHG | DIASTOLIC BLOOD PRESSURE: 77 MMHG
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
## 2021-04-27 DIAGNOSIS — G89.29: ICD-10-CM

## 2021-04-27 DIAGNOSIS — E78.5: ICD-10-CM

## 2021-04-27 DIAGNOSIS — M41.9: ICD-10-CM
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
## 2021-04-27 DIAGNOSIS — E11.9: ICD-10-CM

## 2021-04-27 DIAGNOSIS — M54.9: ICD-10-CM

## 2021-04-27 DIAGNOSIS — S20.212A: ICD-10-CM

## 2021-04-27 DIAGNOSIS — I10: ICD-10-CM

## 2021-04-27 DIAGNOSIS — R07.89: Primary | ICD-10-CM

## 2021-04-27 PROCEDURE — 71101 X-RAY EXAM UNILAT RIBS/CHEST: CPT

## 2021-04-27 PROCEDURE — 99283 EMERGENCY DEPT VISIT LOW MDM: CPT

## 2021-04-27 PROCEDURE — 70450 CT HEAD/BRAIN W/O DYE: CPT

## 2021-04-27 PROCEDURE — 72125 CT NECK SPINE W/O DYE: CPT

## 2021-07-02 ENCOUNTER — HOSPITAL ENCOUNTER (EMERGENCY)
Dept: HOSPITAL 88 - ER | Age: 61
Discharge: HOME | End: 2021-07-02
Payer: COMMERCIAL

## 2021-07-02 VITALS — WEIGHT: 174 LBS | BODY MASS INDEX: 30.83 KG/M2 | HEIGHT: 63 IN

## 2021-07-02 DIAGNOSIS — R42: ICD-10-CM

## 2021-07-02 DIAGNOSIS — E78.5: ICD-10-CM

## 2021-07-02 DIAGNOSIS — I10: ICD-10-CM

## 2021-07-02 DIAGNOSIS — M54.9: ICD-10-CM

## 2021-07-02 DIAGNOSIS — G89.29: ICD-10-CM

## 2021-07-02 DIAGNOSIS — R53.1: Primary | ICD-10-CM

## 2021-07-02 DIAGNOSIS — T67.5XXA: ICD-10-CM

## 2021-07-02 DIAGNOSIS — E11.65: ICD-10-CM

## 2021-07-02 DIAGNOSIS — M41.9: ICD-10-CM

## 2021-07-02 LAB
ALBUMIN SERPL-MCNC: 4.9 G/DL (ref 3.5–5)
ALBUMIN/GLOB SERPL: 1.6 {RATIO} (ref 0.8–2)
ALP SERPL-CCNC: 82 IU/L (ref 40–150)
ALT SERPL-CCNC: 29 IU/L (ref 0–55)
ANION GAP SERPL CALC-SCNC: 15.5 MMOL/L (ref 8–16)
BACTERIA URNS QL MICRO: (no result) /HPF
BASOPHILS # BLD AUTO: 0.1 10*3/UL (ref 0–0.1)
BASOPHILS NFR BLD AUTO: 0.5 % (ref 0–1)
BUN SERPL-MCNC: 20 MG/DL (ref 7–26)
BUN/CREAT SERPL: 26 (ref 6–25)
CALCIUM SERPL-MCNC: 10.4 MG/DL (ref 8.4–10.2)
CHLORIDE SERPL-SCNC: 99 MMOL/L (ref 98–107)
CK MB SERPL-MCNC: 0.8 NG/ML (ref 0–5)
CK SERPL-CCNC: 140 IU/L (ref 29–168)
CLARITY UR: (no result)
CO2 SERPL-SCNC: 26 MMOL/L (ref 22–29)
COLOR UR: YELLOW
DEPRECATED NEUTROPHILS # BLD AUTO: 6 10*3/UL (ref 2.1–6.9)
DEPRECATED RBC URNS MANUAL-ACNC: (no result) /HPF (ref 0–5)
EGFRCR SERPLBLD CKD-EPI 2021: 75 ML/MIN (ref 60–?)
EOSINOPHIL # BLD AUTO: 0.2 10*3/UL (ref 0–0.4)
EOSINOPHIL NFR BLD AUTO: 2 % (ref 0–6)
EPI CELLS URNS QL MICRO: (no result) /LPF
ERYTHROCYTE [DISTWIDTH] IN CORD BLOOD: 12.9 % (ref 11.7–14.4)
GLOBULIN PLAS-MCNC: 3.1 G/DL (ref 2.3–3.5)
GLUCOSE SERPLBLD-MCNC: 219 MG/DL (ref 74–118)
HCT VFR BLD AUTO: 40 % (ref 34.2–44.1)
HGB BLD-MCNC: 13.3 G/DL (ref 12–16)
KETONES UR QL STRIP.AUTO: NEGATIVE
LEUKOCYTE ESTERASE UR QL STRIP.AUTO: NEGATIVE
LYMPHOCYTES # BLD: 3 10*3/UL (ref 1–3.2)
LYMPHOCYTES NFR BLD AUTO: 29.1 % (ref 18–39.1)
MCH RBC QN AUTO: 28.9 PG (ref 28–32)
MCHC RBC AUTO-ENTMCNC: 33.3 G/DL (ref 31–35)
MCV RBC AUTO: 86.8 FL (ref 81–99)
MONOCYTES # BLD AUTO: 1 10*3/UL (ref 0.2–0.8)
MONOCYTES NFR BLD AUTO: 9.9 % (ref 4.4–11.3)
NEUTS SEG NFR BLD AUTO: 58 % (ref 38.7–80)
NITRITE UR QL STRIP.AUTO: NEGATIVE
PLATELET # BLD AUTO: 359 X10E3/UL (ref 140–360)
POTASSIUM SERPL-SCNC: 3.5 MMOL/L (ref 3.5–5.1)
PROT UR QL STRIP.AUTO: NEGATIVE
RBC # BLD AUTO: 4.61 X10E6/UL (ref 3.6–5.1)
SODIUM SERPL-SCNC: 137 MMOL/L (ref 136–145)
SP GR UR STRIP: 1.02 (ref 1.01–1.02)
UROBILINOGEN UR STRIP-MCNC: 0.2 MG/DL (ref 0.2–1)
WBC #/AREA URNS HPF: (no result) /HPF (ref 0–5)

## 2021-07-02 PROCEDURE — 99284 EMERGENCY DEPT VISIT MOD MDM: CPT

## 2021-07-02 PROCEDURE — 85025 COMPLETE CBC W/AUTO DIFF WBC: CPT

## 2021-07-02 PROCEDURE — 80053 COMPREHEN METABOLIC PANEL: CPT

## 2021-07-02 PROCEDURE — 82550 ASSAY OF CK (CPK): CPT

## 2021-07-02 PROCEDURE — 82948 REAGENT STRIP/BLOOD GLUCOSE: CPT

## 2021-07-02 PROCEDURE — 84484 ASSAY OF TROPONIN QUANT: CPT

## 2021-07-02 PROCEDURE — 36415 COLL VENOUS BLD VENIPUNCTURE: CPT

## 2021-07-02 PROCEDURE — 81001 URINALYSIS AUTO W/SCOPE: CPT

## 2021-07-02 PROCEDURE — 82553 CREATINE MB FRACTION: CPT

## 2023-04-08 ENCOUNTER — HOSPITAL ENCOUNTER (EMERGENCY)
Dept: HOSPITAL 88 - FSED | Age: 63
Discharge: HOME | End: 2023-04-08
Payer: COMMERCIAL

## 2023-04-08 VITALS — BODY MASS INDEX: 30.12 KG/M2 | WEIGHT: 170 LBS | HEIGHT: 63 IN

## 2023-04-08 DIAGNOSIS — M54.50: Primary | ICD-10-CM

## 2023-04-08 DIAGNOSIS — R09.81: ICD-10-CM

## 2023-04-08 DIAGNOSIS — M41.9: ICD-10-CM

## 2023-04-08 DIAGNOSIS — G89.29: ICD-10-CM

## 2023-04-08 DIAGNOSIS — M54.9: ICD-10-CM

## 2023-04-08 DIAGNOSIS — E11.9: ICD-10-CM

## 2023-04-08 DIAGNOSIS — I10: ICD-10-CM

## 2023-04-08 DIAGNOSIS — E78.5: ICD-10-CM

## 2023-04-08 PROCEDURE — 99282 EMERGENCY DEPT VISIT SF MDM: CPT

## 2023-06-23 ENCOUNTER — HOSPITAL ENCOUNTER (EMERGENCY)
Dept: HOSPITAL 88 - FSED | Age: 63
Discharge: HOME | End: 2023-06-23
Payer: COMMERCIAL

## 2023-06-23 VITALS — BODY MASS INDEX: 31.54 KG/M2 | HEIGHT: 63 IN | WEIGHT: 178 LBS

## 2023-06-23 VITALS — OXYGEN SATURATION: 96 %

## 2023-06-23 DIAGNOSIS — I10: ICD-10-CM

## 2023-06-23 DIAGNOSIS — G89.29: ICD-10-CM

## 2023-06-23 DIAGNOSIS — E11.65: ICD-10-CM

## 2023-06-23 DIAGNOSIS — M54.9: ICD-10-CM

## 2023-06-23 DIAGNOSIS — E78.5: ICD-10-CM

## 2023-06-23 DIAGNOSIS — E86.0: ICD-10-CM

## 2023-06-23 DIAGNOSIS — R51.9: Primary | ICD-10-CM
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
## 2023-06-23 DIAGNOSIS — R94.31: ICD-10-CM

## 2023-06-23 DIAGNOSIS — M41.9: ICD-10-CM

## 2023-06-23 PROCEDURE — 70450 CT HEAD/BRAIN W/O DYE: CPT

## 2023-06-23 PROCEDURE — 84484 ASSAY OF TROPONIN QUANT: CPT

## 2023-06-23 PROCEDURE — 83518 IMMUNOASSAY DIPSTICK: CPT

## 2023-06-23 PROCEDURE — 80053 COMPREHEN METABOLIC PANEL: CPT

## 2023-06-23 PROCEDURE — 99284 EMERGENCY DEPT VISIT MOD MDM: CPT

## 2023-06-23 PROCEDURE — 93005 ELECTROCARDIOGRAM TRACING: CPT

## 2023-06-23 PROCEDURE — 81003 URINALYSIS AUTO W/O SCOPE: CPT

## 2023-06-23 PROCEDURE — 96374 THER/PROPH/DIAG INJ IV PUSH: CPT

## 2023-06-23 PROCEDURE — 85025 COMPLETE CBC W/AUTO DIFF WBC: CPT

## 2023-06-23 PROCEDURE — 82553 CREATINE MB FRACTION: CPT

## 2023-10-21 ENCOUNTER — HOSPITAL ENCOUNTER (EMERGENCY)
Dept: HOSPITAL 88 - FSED | Age: 63
Discharge: HOME | End: 2023-10-21
Payer: COMMERCIAL

## 2023-10-21 VITALS — WEIGHT: 170 LBS | HEIGHT: 63 IN | BODY MASS INDEX: 30.12 KG/M2

## 2023-10-21 VITALS — OXYGEN SATURATION: 99 %

## 2023-10-21 DIAGNOSIS — E78.5: ICD-10-CM

## 2023-10-21 DIAGNOSIS — E11.65: ICD-10-CM

## 2023-10-21 DIAGNOSIS — R42: Primary | ICD-10-CM

## 2023-10-21 DIAGNOSIS — K21.9: ICD-10-CM

## 2023-10-21 DIAGNOSIS — I10: ICD-10-CM

## 2023-10-21 DIAGNOSIS — R51.9: ICD-10-CM

## 2023-10-21 DIAGNOSIS — M54.9: ICD-10-CM

## 2023-10-21 DIAGNOSIS — R94.31: ICD-10-CM

## 2023-10-21 DIAGNOSIS — G89.29: ICD-10-CM

## 2023-10-21 PROCEDURE — 82553 CREATINE MB FRACTION: CPT

## 2023-10-21 PROCEDURE — 82948 REAGENT STRIP/BLOOD GLUCOSE: CPT

## 2023-10-21 PROCEDURE — 84484 ASSAY OF TROPONIN QUANT: CPT

## 2023-10-21 PROCEDURE — 36415 COLL VENOUS BLD VENIPUNCTURE: CPT

## 2023-10-21 PROCEDURE — 93005 ELECTROCARDIOGRAM TRACING: CPT

## 2023-10-21 PROCEDURE — 80053 COMPREHEN METABOLIC PANEL: CPT

## 2023-10-21 PROCEDURE — 70450 CT HEAD/BRAIN W/O DYE: CPT

## 2023-10-21 PROCEDURE — 81003 URINALYSIS AUTO W/O SCOPE: CPT

## 2023-10-21 PROCEDURE — 85025 COMPLETE CBC W/AUTO DIFF WBC: CPT

## 2023-10-21 PROCEDURE — 99284 EMERGENCY DEPT VISIT MOD MDM: CPT
